# Patient Record
Sex: MALE | Race: WHITE | NOT HISPANIC OR LATINO | Employment: FULL TIME | URBAN - METROPOLITAN AREA
[De-identification: names, ages, dates, MRNs, and addresses within clinical notes are randomized per-mention and may not be internally consistent; named-entity substitution may affect disease eponyms.]

---

## 2017-05-05 ENCOUNTER — TRANSFERRED RECORDS (OUTPATIENT)
Dept: HEALTH INFORMATION MANAGEMENT | Facility: CLINIC | Age: 32
End: 2017-05-05

## 2019-01-08 ENCOUNTER — OFFICE VISIT (OUTPATIENT)
Dept: FAMILY MEDICINE | Facility: CLINIC | Age: 34
End: 2019-01-08
Payer: COMMERCIAL

## 2019-01-08 VITALS
HEIGHT: 72 IN | SYSTOLIC BLOOD PRESSURE: 140 MMHG | DIASTOLIC BLOOD PRESSURE: 90 MMHG | WEIGHT: 205.5 LBS | RESPIRATION RATE: 16 BRPM | OXYGEN SATURATION: 100 % | TEMPERATURE: 98.8 F | BODY MASS INDEX: 27.83 KG/M2 | HEART RATE: 68 BPM

## 2019-01-08 DIAGNOSIS — Z13.220 SCREENING FOR CHOLESTEROL LEVEL: ICD-10-CM

## 2019-01-08 DIAGNOSIS — Z00.00 ENCOUNTER FOR ROUTINE ADULT HEALTH EXAMINATION WITHOUT ABNORMAL FINDINGS: Primary | ICD-10-CM

## 2019-01-08 PROCEDURE — 99385 PREV VISIT NEW AGE 18-39: CPT | Performed by: NURSE PRACTITIONER

## 2019-01-08 ASSESSMENT — MIFFLIN-ST. JEOR: SCORE: 1909.11

## 2019-01-08 ASSESSMENT — PAIN SCALES - GENERAL: PAINLEVEL: MILD PAIN (2)

## 2019-01-08 NOTE — PROGRESS NOTES
SUBJECTIVE:   CC: Rafal pSears is an 33 year old male who presents for preventive health visit.     Healthy Habits:    Do you get at least three servings of calcium containing foods daily (dairy, green leafy vegetables, etc.)? yes    Amount of exercise or daily activities, outside of work: 5 day(s) per week    Problems taking medications regularly No    Medication side effects: No    Have you had an eye exam in the past two years? no    Do you see a dentist twice per year? yes    Do you have sleep apnea, excessive snoring or daytime drowsiness?no    Slight low back pain, left side. Improving over past few weeks.   Left inside big toe slight numbness.     Had ECHO due to fathers poor heart valve 3 years ago and normal    Surgeries: had multiple cysts removed but they were not lipomas        Today's PHQ-2 Score:   PHQ-2 ( 1999 Pf[employer]) 1/8/2019   Q1: Little interest or pleasure in doing things 0   Q2: Feeling down, depressed or hopeless 0   PHQ-2 Score 0       Abuse: Current or Past(Physical, Sexual or Emotional)- No  Do you feel safe in your environment? Yes    Social History     Tobacco Use     Smoking status: Former Smoker     Smokeless tobacco: Never Used   Substance Use Topics     Alcohol use: Yes     Comment: occasional     If you drink alcohol do you typically have >3 drinks per day or >7 drinks per week? No                      Last PSA: No results found for: PSA    Reviewed orders with patient. Reviewed health maintenance and updated orders accordingly - Yes  Labs reviewed in EPIC    Reviewed and updated as needed this visit by clinical staff  Tobacco  Allergies  Meds  Problems  Med Hx  Surg Hx  Fam Hx  Soc Hx          Reviewed and updated as needed this visit by Provider  Tobacco  Allergies  Meds  Problems  Med Hx  Surg Hx  Fam Hx            ROS:  CONSTITUTIONAL: NEGATIVE for fever, chills, change in weight  INTEGUMENTARY/SKIN: NEGATIVE for worrisome rashes, moles or lesions  EYES: NEGATIVE  "for vision changes or irritation  ENT: NEGATIVE for ear, mouth and throat problems  RESP: NEGATIVE for significant cough or SOB  CV: NEGATIVE for chest pain, palpitations or peripheral edema  GI: NEGATIVE for nausea, abdominal pain, heartburn, or change in bowel habits   male: negative for dysuria, hematuria, decreased urinary stream, erectile dysfunction, urethral discharge  MUSCULOSKELETAL: NEGATIVE for significant arthralgias or myalgia  NEURO: NEGATIVE for weakness, dizziness or paresthesias  PSYCHIATRIC: NEGATIVE for changes in mood or affect    OBJECTIVE:   /90 (BP Location: Right arm, Patient Position: Sitting, Cuff Size: Adult Regular)   Pulse 68   Temp 98.8  F (37.1  C) (Oral)   Resp 16   Ht 1.835 m (6' 0.25\")   Wt 93.2 kg (205 lb 8 oz)   SpO2 100%   BMI 27.68 kg/m    EXAM:  GENERAL: healthy, alert and no distress  EYES: Eyes grossly normal to inspection, PERRL and conjunctivae and sclerae normal  HENT: ear canals and TM's normal, nose and mouth without ulcers or lesions  NECK: no adenopathy, no asymmetry, masses, or scars and thyroid normal to palpation  RESP: lungs clear to auscultation - no rales, rhonchi or wheezes  CV: regular rate and rhythm, normal S1 S2, no S3 or S4, no murmur, click or rub, no peripheral edema  ABDOMEN: soft, nontender, no hepatosplenomegaly, no masses and bowel sounds normal  MS: no gross musculoskeletal defects noted, no edema  SKIN: no suspicious lesions or rashes  NEURO: Normal strength and tone, mentation intact and speech normal  PSYCH: mentation appears normal, affect normal/bright    Diagnostic Test Results:  No results found for this or any previous visit (from the past 24 hour(s)).    ASSESSMENT/PLAN:   1. Encounter for routine adult health examination without abnormal findings  Normal exam, return for fasting labs  - Lipid panel reflex to direct LDL Fasting; Future  - Glucose; Future    2. Screening for cholesterol level  As above  - Lipid panel reflex to " "direct LDL Fasting; Future    COUNSELING:  Reviewed preventive health counseling, as reflected in patient instructions    BP Readings from Last 1 Encounters:   01/08/19 140/90     Estimated body mass index is 27.68 kg/m  as calculated from the following:    Height as of this encounter: 1.835 m (6' 0.25\").    Weight as of this encounter: 93.2 kg (205 lb 8 oz).    BP Screening:   Last 3 BP Readings:    BP Readings from Last 3 Encounters:   01/08/19 140/90       The following was recommended to the patient:  Re-screen BP within a year and recommended lifestyle modifications       reports that he has quit smoking. he has never used smokeless tobacco.      Counseling Resources:  ATP IV Guidelines  Pooled Cohorts Equation Calculator  FRAX Risk Assessment  ICSI Preventive Guidelines  Dietary Guidelines for Americans, 2010  USDA's MyPlate  ASA Prophylaxis  Lung CA Screening    Return 1 year or prn    ERLINDA Field, NP-C  [facility]    "

## 2019-04-08 ENCOUNTER — OFFICE VISIT (OUTPATIENT)
Dept: FAMILY MEDICINE | Facility: CLINIC | Age: 34
End: 2019-04-08
Payer: COMMERCIAL

## 2019-04-08 VITALS
DIASTOLIC BLOOD PRESSURE: 100 MMHG | TEMPERATURE: 98 F | SYSTOLIC BLOOD PRESSURE: 142 MMHG | WEIGHT: 205.5 LBS | RESPIRATION RATE: 16 BRPM | BODY MASS INDEX: 27.83 KG/M2 | OXYGEN SATURATION: 100 % | HEIGHT: 72 IN | HEART RATE: 74 BPM

## 2019-04-08 DIAGNOSIS — R05.9 COUGH: Primary | ICD-10-CM

## 2019-04-08 DIAGNOSIS — R68.89 FLU-LIKE SYMPTOMS: ICD-10-CM

## 2019-04-08 DIAGNOSIS — R50.9 FEVER, UNSPECIFIED FEVER CAUSE: ICD-10-CM

## 2019-04-08 DIAGNOSIS — R03.0 ELEVATED BLOOD PRESSURE READING WITHOUT DIAGNOSIS OF HYPERTENSION: ICD-10-CM

## 2019-04-08 DIAGNOSIS — J02.9 SORE THROAT: ICD-10-CM

## 2019-04-08 LAB
DEPRECATED S PYO AG THROAT QL EIA: NORMAL
FLUAV+FLUBV AG SPEC QL: NEGATIVE
FLUAV+FLUBV AG SPEC QL: NEGATIVE
SPECIMEN SOURCE: NORMAL
SPECIMEN SOURCE: NORMAL

## 2019-04-08 PROCEDURE — 87880 STREP A ASSAY W/OPTIC: CPT | Performed by: FAMILY MEDICINE

## 2019-04-08 PROCEDURE — 87804 INFLUENZA ASSAY W/OPTIC: CPT | Performed by: FAMILY MEDICINE

## 2019-04-08 PROCEDURE — 87081 CULTURE SCREEN ONLY: CPT | Performed by: FAMILY MEDICINE

## 2019-04-08 PROCEDURE — 99214 OFFICE O/P EST MOD 30 MIN: CPT | Performed by: FAMILY MEDICINE

## 2019-04-08 RX ORDER — OSELTAMIVIR PHOSPHATE 75 MG/1
75 CAPSULE ORAL 2 TIMES DAILY
Qty: 10 CAPSULE | Refills: 0 | Status: SHIPPED | OUTPATIENT
Start: 2019-04-08 | End: 2020-05-13

## 2019-04-08 ASSESSMENT — MIFFLIN-ST. JEOR: SCORE: 1909.11

## 2019-04-08 ASSESSMENT — PAIN SCALES - GENERAL: PAINLEVEL: MILD PAIN (3)

## 2019-04-08 NOTE — PROGRESS NOTES
SUBJECTIVE:                                                    Rafal Spears is a 33 year old male who presents to clinic today for the following health issues:    Acute Illness   Acute illness concerns: flu sx's  Onset: 1 day    Fever: YES    Chills/Sweats: YES    Headache (location?): YES    Sinus Pressure:YES    Conjunctivitis:  no    Ear Pain: no    Rhinorrhea: YES    Congestion: YES    Sore Throat: YES     Cough: YES-non-productive    Wheeze: no    Decreased Appetite: YES    Nausea: no    Vomiting: no    Diarrhea:  No    Abdominal pain: no    Dysuria/Freq.: no    Fatigue/Achiness: YES    Sick/Strep Exposure: YES- influenza A     Therapies Tried and outcome: Nyquil -     -Patient woke up yesterday with myalgia, subjective fever, cough, congestion, and fatigue. His wife was diagnosed with influenza A last week.   -Has diminished appetite but still eating and drinking   -No history of asthma, previous tobacco smoker but not currently       Problem list and histories reviewed & adjusted, as indicated.  Additional history: as documented    There is no problem list on file for this patient.    No past surgical history on file.    Social History     Tobacco Use     Smoking status: Former Smoker     Smokeless tobacco: Never Used   Substance Use Topics     Alcohol use: Yes     Comment: occasional     Family History   Problem Relation Age of Onset     Thyroid Disease Mother      Heart Disease Father      Other - See Comments Father         heart valve replacement in 2018     Lung Cancer Maternal Grandfather         smoker     Hypertension No family hx of      Anxiety Disorder No family hx of      Alzheimer Disease No family hx of            ROS:  Constitutional, HEENT, cardiovascular, pulmonary, gi and gu systems are negative, except as otherwise noted.    This document serves as a record of the services and decisions personally performed by BELLA RIVAS. It was created on his/her behalf by Ivon  "Sky, a trained medical scribe. The creation of this document is based on the provider's statements to the medical scribe. Ivon Sky, April 8, 2019 9:25 AM  OBJECTIVE:     BP (!) 142/100 (BP Location: Right arm, Patient Position: Sitting, Cuff Size: Adult Regular)   Pulse 74   Temp 98  F (36.7  C) (Oral)   Resp 16   Ht 1.835 m (6' 0.25\")   Wt 93.2 kg (205 lb 8 oz)   SpO2 100%   BMI 27.68 kg/m    Body mass index is 27.68 kg/m .  GENERAL: healthy, alert and no distress  HENT: pharyngeal erythema. Bilateral nasal mucosal edema. ear canals and TM's normal, nose and mouth without ulcers or lesions  NECK: no adenopathy, no asymmetry, masses, or scars and thyroid normal to palpation  RESP: lungs clear to auscultation - no rales, rhonchi or wheezes  CV: regular rate and rhythm, normal S1 S2, no S3 or S4, no murmur, click or rub, no peripheral edema and peripheral pulses strong  ABDOMEN: soft, nontender, no hepatosplenomegaly, no masses and bowel sounds normal  MS: no gross musculoskeletal defects noted, no edema  PSYCH: mentation appears normal, affect normal/bright    Diagnostic Test Results:  Results for orders placed or performed in visit on 04/08/19 (from the past 24 hour(s))   Strep, Rapid Screen   Result Value Ref Range    Specimen Description Throat     Rapid Strep A Screen       NEGATIVE: No Group A streptococcal antigen detected by immunoassay, await culture report.   Influenza A/B antigen   Result Value Ref Range    Influenza A/B Agn Specimen Nasal     Influenza A Negative NEG^Negative    Influenza B Negative NEG^Negative       ASSESSMENT/PLAN:       ICD-10-CM    1. Cough R05 Influenza A/B antigen   2. Fever R50.9 Strep, Rapid Screen     Influenza A/B antigen   3. Sore throat J02.9 Strep, Rapid Screen     Beta strep group A culture   4. Flu-like symptoms R68.89 oseltamivir (TAMIFLU) 75 MG capsule   5. Elevated blood pressure reading without diagnosis of hypertension R03.0      Given recent contact to " confirmed influenza A and symptom description, start Tamiflu. Reviewed symptomatic management of symptoms. Patient education provided, including expected course of illness and symptoms that may occur which would require urgent evalution. All questions answered.     Suspect elevated bp today due to sudafed products and illness. Recent bp was borderline at [facility]. See below for plan. Will need tx if remains elevated. Discussed with patient.   Patient understands and agrees with plan.    Patient Instructions   Avoid Nyquil or sudafed because they can raise your blood pressure. You can take plain Mucinex and tylenol/ibuprofen as needed.     Schedule a medical assistant only visit when you are feeling better to recheck your blood pressure.     Wait at least 24 hours from the end of your fever until you return to work.                  The information in this document, created by the medical scribe for me, accurately reflects the services I personally performed and the decisions made by me. I have reviewed and approved this document for accuracy.   Melissa Ramachandran MD  [facility]

## 2019-04-08 NOTE — PATIENT INSTRUCTIONS
Avoid Nyquil or sudafed because they can raise your blood pressure. You can take plain Mucinex and tylenol/ibuprofen as needed.     Schedule a medical assistant only visit when you are feeling better to recheck your blood pressure.     Wait at least 24 hours from the end of your fever until you return to work.

## 2019-04-08 NOTE — LETTER
April 9, 2019      Rafal Spears  [address]  [place] [address]        Dear Rafal,     Your final strep culture was negative. I hope you will be recovering quickly from this illness.  Certainly be seen in the office if you are not improving as anticipated      Sincerely,        Melissa Ramachandran MD      Results for orders placed or performed in visit on 04/08/19   Strep, Rapid Screen   Result Value Ref Range    Specimen Description Throat     Rapid Strep A Screen       NEGATIVE: No Group A streptococcal antigen detected by immunoassay, await culture report.   Influenza A/B antigen   Result Value Ref Range    Influenza A/B Agn Specimen Nasal     Influenza A Negative NEG^Negative    Influenza B Negative NEG^Negative   Beta strep group A culture   Result Value Ref Range    Specimen Description Throat     Culture Micro No beta hemolytic Streptococcus Group A isolated

## 2019-04-09 PROBLEM — R03.0 ELEVATED BLOOD PRESSURE READING WITHOUT DIAGNOSIS OF HYPERTENSION: Status: ACTIVE | Noted: 2019-04-09

## 2019-04-09 LAB
BACTERIA SPEC CULT: NORMAL
SPECIMEN SOURCE: NORMAL

## 2019-10-22 ENCOUNTER — ALLIED HEALTH/NURSE VISIT (OUTPATIENT)
Dept: NURSING | Facility: CLINIC | Age: 34
End: 2019-10-22
Payer: COMMERCIAL

## 2019-10-22 DIAGNOSIS — Z23 NEED FOR PROPHYLACTIC VACCINATION AND INOCULATION AGAINST INFLUENZA: Primary | ICD-10-CM

## 2019-10-22 PROCEDURE — 90471 IMMUNIZATION ADMIN: CPT

## 2019-10-22 PROCEDURE — 90686 IIV4 VACC NO PRSV 0.5 ML IM: CPT

## 2019-10-22 PROCEDURE — 99207 ZZC NO CHARGE NURSE ONLY: CPT

## 2020-05-13 ENCOUNTER — VIRTUAL VISIT (OUTPATIENT)
Dept: FAMILY MEDICINE | Facility: CLINIC | Age: 35
End: 2020-05-13
Payer: COMMERCIAL

## 2020-05-13 ENCOUNTER — OFFICE VISIT (OUTPATIENT)
Dept: FAMILY MEDICINE | Facility: CLINIC | Age: 35
End: 2020-05-13
Payer: COMMERCIAL

## 2020-05-13 ENCOUNTER — ANCILLARY PROCEDURE (OUTPATIENT)
Dept: GENERAL RADIOLOGY | Facility: CLINIC | Age: 35
End: 2020-05-13
Attending: FAMILY MEDICINE
Payer: COMMERCIAL

## 2020-05-13 VITALS
TEMPERATURE: 99.8 F | OXYGEN SATURATION: 99 % | WEIGHT: 196 LBS | BODY MASS INDEX: 26.4 KG/M2 | HEART RATE: 71 BPM | DIASTOLIC BLOOD PRESSURE: 78 MMHG | SYSTOLIC BLOOD PRESSURE: 122 MMHG

## 2020-05-13 DIAGNOSIS — R07.9 CHEST PAIN, UNSPECIFIED TYPE: ICD-10-CM

## 2020-05-13 DIAGNOSIS — R07.9 CHEST PAIN, UNSPECIFIED TYPE: Primary | ICD-10-CM

## 2020-05-13 PROCEDURE — 99214 OFFICE O/P EST MOD 30 MIN: CPT | Performed by: FAMILY MEDICINE

## 2020-05-13 PROCEDURE — 93000 ELECTROCARDIOGRAM COMPLETE: CPT | Performed by: FAMILY MEDICINE

## 2020-05-13 PROCEDURE — 71046 X-RAY EXAM CHEST 2 VIEWS: CPT | Mod: FY

## 2020-05-13 PROCEDURE — 99207 ZZC NON-BILLABLE SERV PER CHARTING: CPT | Mod: TEL | Performed by: NURSE PRACTITIONER

## 2020-05-13 NOTE — PROGRESS NOTES
Subjective     Rafal Spears is a 34 year old male who presents to clinic today for the following health issues:    HPI   CHEST PAIN     Onset: 2 days     Description:   Location:  entire chest  Character: sharp and burning  Radiation: None   Duration:  Couple seconds     Intensity: moderate    Progression of Symptoms:  waxing and waning    Accompanying Signs & Symptoms:  Shortness of breath: no   Sweating: no   Nausea/vomiting: no   Lightheadedness: no   Palpitations: no  Fever/Chills: no  Cough: no   Heartburn: YES- prior to chest pains starting but no longer    History:   Family history of heart disease no   Tobacco use: YES- past     Precipitating factors:   Worse with exertion: YES- torso movement   Worse with deep breaths :  no   Related to food: no     Alleviating factors:  None        Therapies Tried and outcome: None     On Monday noted generalized cp. Felt like gerd as it was burning and in upper chest area.   Over last several days it feels more generalized as now also on sides, but primary discomfort seems to be around the sternum.    Sharper/burning pain associated with movement, twist.  Pain does not seem to be affected by leaning forward or laying down although movements to get to those positions does cause discomfort    No dyspnea  If take deep breath will occ note the pain  Burping will trigger mild discomfort for brief time  No radiation of pain to the back  Ran on Monday with no issues  Still feels like he could exercise still  But has been trying to lay low the last few days with his current symptoms..     Move/roll over at night will awaken from the pain. So little more tired as not sleeping well.  Otherwise denies malaise, st, cough, fever, cough    Similar type sx's 5 + years ago. Dx with viral pleuritis.  Although notes the symptoms then were preceded by using large hedge tremors aggressively for several hours the day before symptom onset    With the symptoms, he had done a slightly  different workout the day prior to sxs starting  Lunge with twist with weight. Thinks started with a weight that was too heavy (25#).  He eventually decrease the weight but did several wraps before dropping down.  That movement now will trigger discomfort/similar     Sx's not changing over time besides little more wide spread.     At rest, discomfort is 1-2 /10  With trigger (burp/deep breath) 3/10  With rapid movement will go up to 5/10- usually last few secs and then resolves.   No sick contacts.   Working from home since mid-march.     Notes he is very physically fit.  Exercise and runs 5-7 days a week.  He has no underlying cardiopulmonary disease.      Patient Active Problem List   Diagnosis     Elevated blood pressure reading without diagnosis of hypertension     No past surgical history on file.    Social History     Tobacco Use     Smoking status: Former Smoker     Smokeless tobacco: Never Used   Substance Use Topics     Alcohol use: Yes     Comment: occasional     Family History   Problem Relation Age of Onset     Thyroid Disease Mother      Heart Disease Father      Other - See Comments Father         heart valve replacement in 2018     Lung Cancer Maternal Grandfather         smoker     Hypertension No family hx of      Anxiety Disorder No family hx of      Alzheimer Disease No family hx of          No current outpatient medications on file.     No Known Allergies    Reviewed and updated as needed this visit by Provider         Review of Systems   Constitutional, HEENT, cardiovascular, pulmonary, gi and gu systems are negative, except as otherwise noted.      Objective    /78   Pulse 71   Temp 99.8  F (37.7  C) (Oral)   Wt 88.9 kg (196 lb)   SpO2 99%   BMI 26.40 kg/m    Body mass index is 26.4 kg/m .  Physical Exam   GENERAL: healthy, alert and no distress  NECK: no adenopathy, no asymmetry, masses, or scars and thyroid normal to palpation  RESP: lungs clear to auscultation - no rales, rhonchi  or wheezes  CV: regular rate and rhythm, normal S1 S2, no S3 or S4, no murmur, click or rub, no peripheral edema and peripheral pulses strong.  He has mild tenderness along bilateral border of sternum that reproduces his pain to a lesser degree with palpation  ABDOMEN: soft, nontender, no hepatosplenomegaly, no masses and bowel sounds normal  MS: no gross musculoskeletal defects noted, no edema  PSYCH: mentation appears normal, affect normal/bright    Diagnostic Test Results:  CXR -no acute infiltrate or cardiomegaly.  Lung appears fully aerated to me  EKG - appears normal, NSR, normal axis, normal intervals, no acute ST/T changes c/w ischemia, no LVH by voltage criteria, there are no prior tracings available        Assessment & Plan       ICD-10-CM    1. Chest pain, unspecified type  R07.9 EKG 12-lead complete w/read - Clinics     XR Chest 2 Views     Suspect chest wall pain.  He has no dyspnea or associated symptoms that trigger other red flags.  His symptoms are reproducible on exam today and reproducible with movement but not necessarily exertion.  Reviewed diagnosis and treatment including NSAIDs, heat or cold, relative rest.  Reviewed red flag symptoms that would precipitate the need for routine, urgent or emergent f/u     See Patient Instructions    Return in about 2 weeks (around 5/27/2020), or if symptoms worsen or fail to improve.    Melissa Ramachandran MD  [facility]

## 2020-05-13 NOTE — PROGRESS NOTES
"Rafal Spears is a 34 year old male who is being evaluated via a billable telephone visit.      The patient has been notified of following:     \"This telephone visit will be conducted via a call between you and your physician/provider. We have found that certain health care needs can be provided without the need for a physical exam.  This service lets us provide the care you need with a short phone conversation.  If a prescription is necessary we can send it directly to your pharmacy.  If lab work is needed we can place an order for that and you can then stop by our lab to have the test done at a later time.    Telephone visits are billed at different rates depending on your insurance coverage. During this emergency period, for some insurers they may be billed the same as an in-person visit.  Please reach out to your insurance provider with any questions.    If during the course of the call the physician/provider feels a telephone visit is not appropriate, you will not be charged for this service.\"    Patient has given verbal consent for Telephone visit?  Yes    What phone number would you like to be contacted at?     How would you like to obtain your AVS? [employer]    Subjective     Rafal Spears is a 34 year old male who presents to clinic today for the following health issues:    HPI  Chest Pain      Onset: two days, started Monday    Description (location/character/radiation/duration): generalized both side, underneath collar bone and under armpit around rib cages.     Intensity:  moderate    Accompanying signs and symptoms:        Shortness of breath: no        Sweating: no        Nausea/vomitting: no        Palpitations: no        Other (fevers/chills/cough/heartburn/lightheadedness): YES, patient states this feels almost like a heart burn not getting better.    History (similar episodes/previous evaluation): yes, five years ago and was told its viral pleuritis.    Precipitating or alleviating factors:       " Worse with exertion: no , deep breaths increases discomfort, holding breaths feels better.       Worse with breathing: no        Related to eating: no        Better with burping: no     Therapies tried and outcome: None      At first thought it was heartburn. Sharp pain behind his collarbone upper chest. Now more generalized chest pain under armpits around rib cage. If he moves suddenly or change position or burp he gets a sharp pain into the upper chest. Sometimes around sternum, but mostly under armpits and under collar bone. Tried alkacelcer, didn't help. Normal steady breathing no pain, but deep breath has some pain or discomfort. If he holds his breath the pain seems to go away. Denies chest trauma, unusual heavy lifting or twisting. He normally weight lifts, Sunday he tried a new exercise where he lunges and does a slight twist and was using a 25 lb weight (felt a bit heavy for this particular movement but otherwise uses this weight for other exercises). No fever chills. Temp last night was 99, this morning 98.8. hasn't noted if food worsens or improves his symptoms. Doesn't have reflux and doesn't feel this is related to that.  Normal BM, normal urination. No other unusual muscle aches other than chest and from regular weight lifting. No recent long travel.     He is able to work from home. Has been able to reduce exposure with related to COVID-19, has golfed a few times in the past few weeks. Has gone with neighbors and maintained social distance. They are all healthy.           Patient Active Problem List   Diagnosis     Elevated blood pressure reading without diagnosis of hypertension     History reviewed. No pertinent surgical history.    Social History     Tobacco Use     Smoking status: Former Smoker     Smokeless tobacco: Never Used   Substance Use Topics     Alcohol use: Yes     Comment: occasional     Family History   Problem Relation Age of Onset     Thyroid Disease Mother      Heart Disease Father       Other - See Comments Father         heart valve replacement in 2018     Lung Cancer Maternal Grandfather         smoker     Hypertension No family hx of      Anxiety Disorder No family hx of      Alzheimer Disease No family hx of          No current outpatient medications on file.     No Known Allergies    Reviewed and updated as needed this visit by Provider  Tobacco  Allergies  Meds  Problems  Med Hx  Surg Hx  Fam Hx         Review of Systems   Constitutional, HEENT, cardiovascular, pulmonary, gi and gu systems are negative, except as otherwise noted.       Objective   Reported vitals:  There were no vitals taken for this visit.   healthy, alert and no distress  PSYCH: Alert and oriented times 3; coherent speech, normal   rate and volume, able to articulate logical thoughts, able   to abstract reason, no tangential thoughts, no hallucinations   or delusions  His affect is normal  RESP: No cough, no audible wheezing, able to talk in full sentences  Remainder of exam unable to be completed due to telephone visits    Diagnostic Test Results:  Labs reviewed in Epic        Assessment/Plan:  1. Chest pain, unspecified type  Ultimately decided he needed to be seen in person today. No charge for phone visit.     Return in about 1 day (around 5/14/2020), or if symptoms worsen or fail to improve.      Phone call duration:  16 minutes    ERLINDA Field, NP-C  [facility]

## 2020-05-13 NOTE — Clinical Note
FYI: patient coming to see you today at 1:20 pm. Chest pain x 2 days, had pleuritis about 5 years ago and he feels this is similar symptoms. Hx of ECHO in 2016 EF 60% but very mild mitral/tricus regur. Fx hx of mitral valve replacement in father. No fever/chills/recent travel.   Thank you,  ERLINDA Field, NP-C  [facility]

## 2020-05-13 NOTE — PATIENT INSTRUCTIONS
Ibuprofen 600 mg every 6-8 hours (take with food).   Ice/heat as needed   Avoid heavy lifting until feeling           Patient Education     Chest Wall Pain: Costochondritis    The chest pain that you have had today is caused by costochondritis. This condition is caused by an inflammation of the cartilage joining your ribs to your breastbone. It is not caused by heart or lung problems. Your healthcare team has made sure that the chest pain you feel is not from a life threatening cause of chest pain such as heart attack, collapsed lung, blood clot in the lung, tear in the aorta, or esophageal rupture. The inflammation may have been brought on by a blow to the chest, lifting heavy objects, intense exercise, or an illness that made you cough and sneeze a lot. It often occurs during times of emotional stress. It can be painful, but it is not dangerous. It usually goes away in 1 to 2 weeks. But it may happen again. Rarely, a more serious condition may cause symptoms similar to costochondritis. That s why it s important to watch for the warning signs listed below.  Home care  Follow these guidelines when caring for yourself at home:    If you feel that emotional stress is a cause of your condition, try to figure out the sources of that stress. It may not be obvious. Learn ways to deal with the stress in your life. This can include regular exercise, muscle relaxation, meditation, or simply taking time out for yourself.    You may use acetaminophen, ibuprofen, or naproxen to control pain, unless another pain medicine was prescribed. If you have liver or kidney disease or ever had a stomach ulcer, talk with your healthcare provider before using these medicines.    You can also help ease pain by using a hot, wet compress or heating pad. Use this with or without a medicated skin cream that helps relieves pain.    Do stretching exercise as advised by your provider.    Take any prescribed medicines as directed.  Follow-up  care  Follow up with your healthcare provider, or as advised, if you do not start to get better in the next 2 days.  When to seek medical advice  Call your healthcare provider right away if any of these occur:    A change in the type of pain. Call if it feels different, becomes more serious, lasts longer, or spreads into your shoulder, arm, neck, jaw, or back.    Shortness of breath or pain gets worse when you breathe    Weakness, dizziness, or fainting    Cough with dark-colored sputum (phlegm) or blood    Abdominal pain    Dark red or black stools    Fever of 100.4 F (38 C) or higher, or as directed by your healthcare provider  Date Last Reviewed: 12/1/2016 2000-2019 [employer]. [address] [place] [address], [place] [address]. All rights reserved. This information is not intended as a substitute for professional medical care. Always follow your healthcare professional's instructions.           Patient Education     Chest Wall Pain: Costochondritis    The chest pain that you have had today is caused by costochondritis. This condition is caused by an inflammation of the cartilage joining your ribs to your breastbone. It is not caused by heart or lung problems. Your healthcare team has made sure that the chest pain you feel is not from a life threatening cause of chest pain such as heart attack, collapsed lung, blood clot in the lung, tear in the aorta, or esophageal rupture. The inflammation may have been brought on by a blow to the chest, lifting heavy objects, intense exercise, or an illness that made you cough and sneeze a lot. It often occurs during times of emotional stress. It can be painful, but it is not dangerous. It usually goes away in 1 to 2 weeks. But it may happen again. Rarely, a more serious condition may cause symptoms similar to costochondritis. That s why it s important to watch for the warning signs listed below.  Home care  Follow these guidelines when caring for yourself at  home:    If you feel that emotional stress is a cause of your condition, try to figure out the sources of that stress. It may not be obvious. Learn ways to deal with the stress in your life. This can include regular exercise, muscle relaxation, meditation, or simply taking time out for yourself.    You may use acetaminophen, ibuprofen, or naproxen to control pain, unless another pain medicine was prescribed. If you have liver or kidney disease or ever had a stomach ulcer, talk with your healthcare provider before using these medicines.    You can also help ease pain by using a hot, wet compress or heating pad. Use this with or without a medicated skin cream that helps relieves pain.    Do stretching exercise as advised by your provider.    Take any prescribed medicines as directed.  Follow-up care  Follow up with your healthcare provider, or as advised, if you do not start to get better in the next 2 days.  When to seek medical advice  Call your healthcare provider right away if any of these occur:    A change in the type of pain. Call if it feels different, becomes more serious, lasts longer, or spreads into your shoulder, arm, neck, jaw, or back.    Shortness of breath or pain gets worse when you breathe    Weakness, dizziness, or fainting    Cough with dark-colored sputum (phlegm) or blood    Abdominal pain    Dark red or black stools    Fever of 100.4 F (38 C) or higher, or as directed by your healthcare provider  Date Last Reviewed: 12/1/2016 2000-2019 [employer]. [address] [place] [address], [place] [address]. All rights reserved. This information is not intended as a substitute for professional medical care. Always follow your healthcare professional's instructions.

## 2021-01-12 ENCOUNTER — VIRTUAL VISIT (OUTPATIENT)
Dept: FAMILY MEDICINE | Facility: CLINIC | Age: 36
End: 2021-01-12
Payer: COMMERCIAL

## 2021-01-12 DIAGNOSIS — F41.9 ANXIETY: ICD-10-CM

## 2021-01-12 DIAGNOSIS — F33.1 MODERATE RECURRENT MAJOR DEPRESSION (H): Primary | ICD-10-CM

## 2021-01-12 PROCEDURE — 96127 BRIEF EMOTIONAL/BEHAV ASSMT: CPT | Performed by: FAMILY MEDICINE

## 2021-01-12 PROCEDURE — 99215 OFFICE O/P EST HI 40 MIN: CPT | Mod: 95 | Performed by: FAMILY MEDICINE

## 2021-01-12 ASSESSMENT — ANXIETY QUESTIONNAIRES
GAD7 TOTAL SCORE: 13
1. FEELING NERVOUS, ANXIOUS, OR ON EDGE: NEARLY EVERY DAY
IF YOU CHECKED OFF ANY PROBLEMS ON THIS QUESTIONNAIRE, HOW DIFFICULT HAVE THESE PROBLEMS MADE IT FOR YOU TO DO YOUR WORK, TAKE CARE OF THINGS AT HOME, OR GET ALONG WITH OTHER PEOPLE: SOMEWHAT DIFFICULT
6. BECOMING EASILY ANNOYED OR IRRITABLE: NEARLY EVERY DAY
5. BEING SO RESTLESS THAT IT IS HARD TO SIT STILL: SEVERAL DAYS
2. NOT BEING ABLE TO STOP OR CONTROL WORRYING: SEVERAL DAYS
3. WORRYING TOO MUCH ABOUT DIFFERENT THINGS: SEVERAL DAYS
7. FEELING AFRAID AS IF SOMETHING AWFUL MIGHT HAPPEN: SEVERAL DAYS

## 2021-01-12 ASSESSMENT — PATIENT HEALTH QUESTIONNAIRE - PHQ9
SUM OF ALL RESPONSES TO PHQ QUESTIONS 1-9: 19
5. POOR APPETITE OR OVEREATING: NEARLY EVERY DAY

## 2021-01-12 NOTE — PROGRESS NOTES
Rafal is a 35 year old who is being evaluated via a billable telephone visit.      What phone number would you like to be contacted at? [phone]  How would you like to obtain your AVS? MyChart (patient would like to sign up)    Assessment & Plan     Moderate recurrent major depression (H)  Anxiety  New dx. No hx of howard. Reviewed onset of action of meds, common side effects and plan for close f/u. Encouraged call to clinic if symptoms worsening or adverse reactions. Reduce or eliminate etoh discussed.   - sertraline (ZOLOFT) 50 MG tablet; Take 0.5 tablets (25 mg) by mouth daily for 7 days, THEN 1 tablet (50 mg) daily.      44 minutes spent on the date of the encounter doing chart review, patient visit and documentation        Depression Screening Follow Up    PHQ 1/12/2021   PHQ-9 Total Score 19   Q9: Thoughts of better off dead/self-harm past 2 weeks Not at all         Follow Up Actions Taken  4 weeks       Return in about 4 weeks (around 2/9/2021) for med check, using a video visit.    Melissa Ramachandran MD  [facility]     Rafal is a 35 year old who presents to clinic today for the following health issues     HPI   Depression/anxiety issues starting in June/July 2020  Tried to push through but getting worse over the last 2 months.   Also started through employer, virtual therapist.     On edge    Notes in college after freshman year thinks maybe had depression at that time time that lasted for one year but never f/u'd.     Abnormal Mood Symptoms  Onset/Duration:   Description: see above    Depression (if yes, do PHQ-9): YES  Anxiety (if yes, do NONA-7): YES  Accompanying Signs & Symptoms:  Still participating in activities that you used to enjoy: YES- try to. Trying to run/cycle/lift weights but harder in the last few weeks to get the motivation to do it.   Fatigue: YES  Irritability: YES  Difficulty concentrating: YES  Changes in appetite: YES-  increased  Problems with sleep: YES- every night wakening at 0300 and then up 1-2 hours.   Heart racing/beating fast: no  Abnormally elevated, expansive, or irritable mood: no  Persistently increased activity or energy: no  Thoughts of hurting yourself or others: no  History:  Recent stress or major life event: no. Notes lack of events from isolation has been impactful - not being able to see friends/families   Prior depression or anxiety: yes, as above  Family history of depression or anxiety: no  Alcohol/drug use: YES- not excessive/causing problems per patient. Usually 1 drink per day and occ 2nd.   Difficulty sleeping: YES- see above.   Precipitating or alleviating factors: being with friends/family and being outdoors tend to help.  Therapies tried and outcome: see above.     PHQ-9 SCORE 1/12/2021   PHQ-9 Total Score 19     NONA-7 SCORE 1/12/2021   Total Score 13         Review of Systems   Constitutional, HEENT, cardiovascular, pulmonary, gi and gu systems are negative, except as otherwise noted.  Wt up a little bit in the last month as has been eating more. Feels less self control related to food/eating more sweets.         Objective           Vitals:  No vitals were obtained today due to virtual visit.    Physical Exam   healthy, alert and no distress  PSYCH: Alert and oriented times 3; coherent speech, normal   rate and volume, able to articulate logical thoughts, able   to abstract reason, no tangential thoughts, no hallucinations   or delusions  His affect is normal  RESP: No cough, no audible wheezing, able to talk in full sentences  Remainder of exam unable to be completed due to telephone visits            Phone call duration: 42 minutes

## 2021-01-13 ASSESSMENT — ANXIETY QUESTIONNAIRES: GAD7 TOTAL SCORE: 13

## 2021-01-23 ENCOUNTER — HEALTH MAINTENANCE LETTER (OUTPATIENT)
Age: 36
End: 2021-01-23

## 2021-02-03 DIAGNOSIS — F33.1 MODERATE RECURRENT MAJOR DEPRESSION (H): ICD-10-CM

## 2021-02-03 DIAGNOSIS — F41.9 ANXIETY: ICD-10-CM

## 2021-02-03 NOTE — TELEPHONE ENCOUNTER
Routing refill request to provider for review/approval because:  PHQ-9 above protocol  PHQ 1/12/2021   PHQ-9 Total Score 19   Q9: Thoughts of better off dead/self-harm past 2 weeks Not at all     Juany Balderas RN, BSN, CMSRN  [facility]

## 2021-02-09 ENCOUNTER — VIRTUAL VISIT (OUTPATIENT)
Dept: FAMILY MEDICINE | Facility: CLINIC | Age: 36
End: 2021-02-09
Payer: COMMERCIAL

## 2021-02-09 DIAGNOSIS — F33.1 MODERATE RECURRENT MAJOR DEPRESSION (H): Primary | ICD-10-CM

## 2021-02-09 DIAGNOSIS — F41.9 ANXIETY: ICD-10-CM

## 2021-02-09 PROCEDURE — 99214 OFFICE O/P EST MOD 30 MIN: CPT | Mod: 95 | Performed by: FAMILY MEDICINE

## 2021-02-09 RX ORDER — BUPROPION HYDROCHLORIDE 150 MG/1
150 TABLET ORAL EVERY MORNING
Qty: 30 TABLET | Refills: 1 | Status: SHIPPED | OUTPATIENT
Start: 2021-02-09 | End: 2021-03-08

## 2021-02-09 NOTE — PATIENT INSTRUCTIONS
Try changing the time of day you take the sertraline to afternoon or evening.     If ongoing side effects after one more week with changes above, you can start wellbutrin XL once daily in the morning.

## 2021-02-09 NOTE — PROGRESS NOTES
"Rafal is a 35 year old who is being evaluated via a billable telephone visit.      What phone number would you like to be contacted at? See epic  How would you like to obtain your AVS? MyChart     Assessment & Plan     Moderate recurrent major depression (H)  Anxiety  Improved with start of sertraline. Some fatigue and sexual AE present.   See patient instructions. Reviewed wellbutrin start. Reviewed onset of action of meds, common side effects and plan for close f/u. Encouraged call to clinic if symptoms worsening or adverse reactions.         31 minutes spent on the date of the encounter doing chart review, patient visit and documentation            Patient Instructions   Try changing the time of day you take the sertraline to afternoon or evening.     If ongoing side effects after one more week with changes above, you can start wellbutrin XL once daily in the morning.           Return in about 4 weeks (around 3/9/2021) for med check, using a video visit.    Melissa Ramachandran MD  [facility]     Rafal is a 35 year old who presents to clinic today for the following health issues     HPI     Last visit (1/12/21) was started on sertraline for new dx of MDD, recurrent and Anxiety.     Reports \"doing pretty good\". About 70--80%  A lot better than one month ago  Feels med is working.   Noting more like himself and mood seems more steady/stable. Less night wakening and less negative thoughts. Less tense, less down and depressed    Tolerating med pretty well currently  First 1-1.5 weeks had some digestive issues and headaches but these are improved.   Tired a lot. At times feels like he wants to rest in the middle of the day.   Also by evening is exhausted. Also had some sexual side effects/libido.   Not worsening or improving.     No hx of sz.           Objective           Vitals:  No vitals were obtained today due to virtual visit.    Physical Exam   healthy, alert and " no distress  PSYCH: Alert and oriented times 3; coherent speech, normal   rate and volume, able to articulate logical thoughts, able   to abstract reason, no tangential thoughts, no hallucinations   or delusions  His affect is normal and pleasant  RESP: No cough, no audible wheezing, able to talk in full sentences  Remainder of exam unable to be completed due to telephone visits            Phone call duration: 24 minutes

## 2021-03-06 ENCOUNTER — TELEPHONE (OUTPATIENT)
Dept: FAMILY MEDICINE | Facility: CLINIC | Age: 36
End: 2021-03-06

## 2021-03-06 DIAGNOSIS — F33.1 MODERATE RECURRENT MAJOR DEPRESSION (H): ICD-10-CM

## 2021-03-08 RX ORDER — BUPROPION HYDROCHLORIDE 150 MG/1
150 TABLET ORAL EVERY MORNING
Qty: 30 TABLET | Refills: 0 | Status: SHIPPED | OUTPATIENT
Start: 2021-03-08 | End: 2021-03-23

## 2021-03-08 NOTE — TELEPHONE ENCOUNTER
Overdue for PHQ-9/ depression follow-up with provider.     No appointment pending at this time.  Routing to provider to advise.    Lyndsay DURPEEN, RN

## 2021-03-09 NOTE — TELEPHONE ENCOUNTER
Sonia refill sent  Due for med check prior to further refills (virutal visit is ok)  Please send reminder

## 2021-03-23 ENCOUNTER — VIRTUAL VISIT (OUTPATIENT)
Dept: FAMILY MEDICINE | Facility: CLINIC | Age: 36
End: 2021-03-23
Payer: COMMERCIAL

## 2021-03-23 DIAGNOSIS — F41.9 ANXIETY: Primary | ICD-10-CM

## 2021-03-23 DIAGNOSIS — F33.1 MODERATE RECURRENT MAJOR DEPRESSION (H): ICD-10-CM

## 2021-03-23 PROCEDURE — 99213 OFFICE O/P EST LOW 20 MIN: CPT | Mod: TEL | Performed by: FAMILY MEDICINE

## 2021-03-23 RX ORDER — BUPROPION HYDROCHLORIDE 100 MG/1
100 TABLET, EXTENDED RELEASE ORAL EVERY MORNING
Qty: 30 TABLET | Refills: 1 | Status: SHIPPED | OUTPATIENT
Start: 2021-03-23 | End: 2021-04-14

## 2021-03-23 NOTE — LETTER
My Depression Action Plan  Name: Rafal Spears   Date of Birth 1985  Date: 3/23/2021    My doctor: Clinic, [place]   My clinic: [place] [id number] [facility] [id number] [phone]          GREEN    ZONE   Good Control    What it looks like:     Things are going generally well. You have normal ups and downs. You may even feel depressed from time to time, but bad moods usually last less than a day.   What you need to do:  1. Continue to care for yourself (see self care plan)  2. Check your depression survival kit and update it as needed  3. Follow your physician s recommendations including any medication.  4. Do not stop taking medication unless you consult with your physician first.           YELLOW         ZONE Getting Worse    What it looks like:     Depression is starting to interfere with your life.     It may be hard to get out of bed; you may be starting to isolate yourself from others.    Symptoms of depression are starting to last most all day and this has happened for several days.     You may have suicidal thoughts but they are not constant.   What you need to do:     1. Call your care team. Your response to treatment will improve if you keep your care team informed of your progress. Yellow periods are signs an adjustment may need to be made.     2. Continue your self-care.  Just get dressed and ready for the day.  Don't give yourself time to talk yourself out of it.    3. Talk to someone in your support network.    4. Open up your Depression Self-Care Plan/Wellness Kit.           RED    ZONE Medical Alert - Get Help    What it looks like:     Depression is seriously interfering with your life.     You may experience these or other symptoms: You can t get out of bed most days, can t work or engage in other necessary activities, you have trouble taking care of basic hygiene, or basic responsibilities, thoughts of suicide or  death that will not go away, self-injurious behavior.     What you need to do:  1. Call your care team and request a same-day appointment. If they are not available (weekends or after hours) call your local crisis line, emergency room or 911.          Depression Self-Care Plan / Wellness Kit    Many people find that medication and therapy are helpful treatments for managing depression. In addition, making small changes to your everyday life can help to boost your mood and improve your wellbeing. Below are some tips for you to consider. Be sure to talk with your medical provider and/or behavioral health consultant if your symptoms are worsening or not improving.     Sleep   Sleep hygiene  means all of the habits that support good, restful sleep. It includes maintaining a consistent bedtime and wake time, using your bedroom only for sleeping or sex, and keeping the bedroom dark and free of distractions like a computer, smartphone, or television.     Develop a Healthy Routine  Maintain good hygiene. Get out of bed in the morning, make your bed, brush your teeth, take a shower, and get dressed. Don t spend too much time viewing media that makes you feel stressed. Find time to relax each day.    Exercise  Get some form of exercise every day. This will help reduce pain and release endorphins, the  feel good  chemicals in your brain. It can be as simple as just going for a walk or doing some gardening, anything that will get you moving.      Diet  Strive to eat healthy foods, including fruits and vegetables. Drink plenty of water. Avoid excessive sugar, caffeine, alcohol, and other mood-altering substances.     Stay Connected with Others  Stay in touch with friends and family members.    Manage Your Mood  Try deep breathing, massage therapy, biofeedback, or meditation. Take part in fun activities when you can. Try to find something to smile about each day.     Psychotherapy  Be open to working with a therapist if your  provider recommends it.     Medication  Be sure to take your medication as prescribed. Most anti-depressants need to be taken every day. It usually takes several weeks for medications to work. Not all medicines work for all people. It is important to follow-up with your provider to make sure you have a treatment plan that is working for you. Do not stop your medication abruptly without first discussing it with your provider.    Crisis Resources   These hotlines are for both adults and children. They and are open 24 hours a day, 7 days a week unless noted otherwise.      National Suicide Prevention Lifeline   5-572-642-MWQC (9354)      Crisis Text Line    www.crisistextline.org  Text HOME to 201526 from anywhere in the [place], anytime, about any type of crisis. A live, trained crisis counselor will receive the text and respond quickly.      [place] Lifeline for LGBTQ Youth  A national crisis intervention and suicide lifeline for LGBTQ youth under 25. Provides a safe place to talk without judgement. Call 1-169.835.9742; text START to 668381 or visit www.thetrevorproject.org to talk to a trained counselor.      For [facility] crisis numbers, visit the [facility] website at:  https://[place].gov/dhs/people-we-serve/adults/health-care/mental-health/resources/crisis-contacts.jsp

## 2021-03-23 NOTE — PROGRESS NOTES
Rafal is a 35 year old who is being evaluated via a billable telephone visit.      What phone number would you like to be contacted at? Cell   How would you like to obtain your AVS? MyChart    Assessment & Plan     Anxiety  Moderate recurrent major depression (H)  Overall doing well but some adverse effects from the Wellbutrin 150 mg XL dose.  Reviewed the option of stopping the medicine altogether versus trialing 100 mg dose of the SR version.  Patient is in favor of the latter and we talked through the transition and monitoring of his symptoms post med change.  If he does well with this change and wants to continue medication will plan for next med check in 6 months.  If he has ongoing mood concerns or medication side effects he should certainly follow-up with me sooner.  It also would be okay to stop Wellbutrin altogether if he does not find it beneficial to make the change above.  Patient feels comfortable with this plan and all questions were answered.  We will send depression action plan in the mail to patient.  - buPROPion (WELLBUTRIN SR) 100 MG 12 hr tablet; Take 1 tablet (100 mg) by mouth every morning         Patient Instructions   Stop wellbutrin  mg   And start wellbutrin  mg daily in the morning.     If working well we can plan next med check in six months  If mood or medication concerns please follow-up with me much sooner and as needed.    If the transition to the lower dose of Wellbutrin does not work well for you it is okay to stop this medication abruptly       Return in about 6 months (around 9/23/2021).    Melissa Ramachandran MD  [facility]   Rafal is a 35 year old who presents for the following health issues    HPI     Overall doing well.   wellbutrin started last visit. Not sure if wanting to continue on the med. Had started for fatigue and sexual side effects of sertraline   Little more irritable/agitated since starting  More sleep  disruptions and middle of night wakening. Then feels tired the next day.   Slight improvement in sexual side effects with the med and does have more activated feeling in morning especially.     In general, mood is better, more stable overall. No longer feeling depressed and sad. More enjoyment in daily life, better connections with family. No longer craving etoh at the end of the day. Feel more steady/stable.       PHQ-9 SCORE 1/12/2021 2/25/2021   PHQ-9 Total Score MyChart - 7 (Mild depression)   PHQ-9 Total Score 19 7     NONA-7 SCORE 1/12/2021 2/25/2021   Total Score - 5 (mild anxiety)   Total Score 13 5             Objective           Vitals:  No vitals were obtained today due to virtual visit.    Physical Exam   healthy, alert and no distress  PSYCH: Alert and oriented times 3; coherent speech, normal   rate and volume, able to articulate logical thoughts, able   to abstract reason, no tangential thoughts, no hallucinations   or delusions  His affect is normal and pleasant  RESP: No cough, no audible wheezing, able to talk in full sentences  Remainder of exam unable to be completed due to telephone visits        Phone call duration: 14 minutes

## 2021-03-23 NOTE — PATIENT INSTRUCTIONS
Stop wellbutrin  mg   And start wellbutrin  mg daily in the morning.     If working well we can plan next med check in six months  If mood or medication concerns please follow-up with me much sooner and as needed.    If the transition to the lower dose of Wellbutrin does not work well for you it is okay to stop this medication abruptly

## 2021-05-28 ENCOUNTER — NURSE TRIAGE (OUTPATIENT)
Dept: NURSING | Facility: CLINIC | Age: 36
End: 2021-05-28

## 2021-05-28 DIAGNOSIS — F33.1 MODERATE RECURRENT MAJOR DEPRESSION (H): ICD-10-CM

## 2021-05-28 DIAGNOSIS — F41.9 ANXIETY: ICD-10-CM

## 2021-05-28 RX ORDER — BUPROPION HYDROCHLORIDE 100 MG/1
100 TABLET, EXTENDED RELEASE ORAL EVERY MORNING
Qty: 5 TABLET | Refills: 0 | Status: SHIPPED | OUTPATIENT
Start: 2021-05-28 | End: 2021-09-07

## 2021-05-28 NOTE — TELEPHONE ENCOUNTER
Patient calling following up on status of refill. See other telephone encounter. RN called over to [facility] and spoke with Celia DURANT, who stated she will address refill request for patient. Patient informed and verbalizes understanding.     Nuzhat Calzada RN, BSN Nurse Triage Advisor 9:08 AM 5/28/2021

## 2021-05-28 NOTE — TELEPHONE ENCOUNTER
Triage call:     Is in [place] today- traveling and forgot his medication at home  Requesting a bridge prescription through Monday  Bupropion and Sertraline  Pharmacy pended in encounter.   Patient states there is no nearby [facility] pharmacy- requested to be sent to [facility]- pended in encounter.    Provider please advise on bridge refill request    Emili Regalado RN BSN 5/28/2021 6:49 AM      Reason for Disposition    Caller has urgent medication question about med that PCP prescribed and triager unable to answer question    Additional Information    Negative: Drug overdose and triager unable to answer question    Negative: Caller requesting information unrelated to medicine    Negative: Caller requesting a prescription for Strep throat and has a positive culture result    Negative: Rash while taking a medication or within 3 days of stopping it    Negative: Immunization reaction suspected    Negative: Asthma and having symptoms of asthma (cough, wheezing, etc.)    Negative: Breastfeeding questions about mother's medicines and diet    Negative: MORE THAN A DOUBLE DOSE of a prescription or over-the-counter (OTC) drug    Negative: DOUBLE DOSE (an extra dose or lesser amount) of over-the-counter (OTC) drug and any symptoms (e.g., dizziness, nausea, pain, sleepiness)    Negative: DOUBLE DOSE (an extra dose or lesser amount) of prescription drug and any symptoms (e.g., dizziness, nausea, pain, sleepiness)    Negative: Took another person's prescription drug    Negative: DOUBLE DOSE (an extra dose or lesser amount) of prescription drug and NO symptoms (Exception: a double dose of antibiotics)    Negative: Diabetes drug error or overdose (e.g., took wrong type of insulin or took extra dose)    Negative: Caller has medication question about med not prescribed by PCP and triager unable to answer question (e.g., compatibility with other med, storage)    Negative: Request for URGENT new prescription or refill of 'essential'  medication (i.e., likelihood of harm to patient if not taken) and triager unable to fill per department policy    Negative: Prescription not at pharmacy and was prescribed today by PCP    Negative: Pharmacy calling with prescription questions and triager unable to answer question    Protocols used: MEDICATION QUESTION CALL-A-OH

## 2021-06-30 ENCOUNTER — NURSE TRIAGE (OUTPATIENT)
Dept: NURSING | Facility: CLINIC | Age: 36
End: 2021-06-30

## 2021-06-30 ENCOUNTER — TELEPHONE (OUTPATIENT)
Dept: FAMILY MEDICINE | Facility: CLINIC | Age: 36
End: 2021-06-30

## 2021-06-30 NOTE — TELEPHONE ENCOUNTER
Called pt and let him know that there is no current recommendation in getting a booster vaccine and to check the cdc web page for up to date info

## 2021-06-30 NOTE — TELEPHONE ENCOUNTER
Patient received the Eliu and Eliu vaccine in March.  Patient will be traveling to [place] as well as doing a bike trip across [place] in the next couple months  Due to the new variant, patient wondering if should get the Pfizer vaccine as a booster      To provider to advise    Celia DUPREEN, RN

## 2021-06-30 NOTE — TELEPHONE ENCOUNTER
Will be traveling in [place] soon.  Askign about pfizer booster shot only.  Gave him number for MDALEXANDRA.    Mary Peace RN  [facility] Nurse Advisor

## 2021-06-30 NOTE — TELEPHONE ENCOUNTER
No booster shots are currently being recommended. Recommend reviewing CDC.gov for latest on COVID vaccination update and keeping himself safe during travels.

## 2021-08-12 DIAGNOSIS — F41.9 ANXIETY: ICD-10-CM

## 2021-08-12 DIAGNOSIS — F33.1 MODERATE RECURRENT MAJOR DEPRESSION (H): ICD-10-CM

## 2021-08-12 NOTE — TELEPHONE ENCOUNTER
"Requested Prescriptions   Pending Prescriptions Disp Refills    sertraline (ZOLOFT) 50 MG tablet [Pharmacy Med Name: SERTRALINE HCL 50 MG TABLET] 90 tablet 1     Sig: TAKE 1 TABLET BY MOUTH EVERY DAY        SSRIs Protocol Failed - 8/12/2021 12:21 AM        Failed - PHQ-9 score less than 5 in past 6 months     Please review last PHQ-9 score.           Passed - Medication is active on med list        Passed - Patient is age 18 or older        Passed - Recent (6 mo) or future (30 days) visit within the authorizing provider's specialty     Patient had office visit in the last 6 months or has a visit in the next 30 days with authorizing provider or within the authorizing provider's specialty.  See \"Patient Info\" tab in inbasket, or \"Choose Columns\" in Meds & Orders section of the refill encounter.                  "

## 2021-08-31 ENCOUNTER — MYC MEDICAL ADVICE (OUTPATIENT)
Dept: FAMILY MEDICINE | Facility: CLINIC | Age: 36
End: 2021-08-31

## 2021-08-31 NOTE — TELEPHONE ENCOUNTER
Routing to provider to review and advise.   Omayra George RN, BSN   [facility]

## 2021-09-04 ENCOUNTER — HEALTH MAINTENANCE LETTER (OUTPATIENT)
Age: 36
End: 2021-09-04

## 2021-09-07 ENCOUNTER — VIRTUAL VISIT (OUTPATIENT)
Dept: FAMILY MEDICINE | Facility: CLINIC | Age: 36
End: 2021-09-07
Payer: COMMERCIAL

## 2021-09-07 DIAGNOSIS — F33.1 MODERATE RECURRENT MAJOR DEPRESSION (H): ICD-10-CM

## 2021-09-07 DIAGNOSIS — F41.9 ANXIETY: ICD-10-CM

## 2021-09-07 PROCEDURE — 99214 OFFICE O/P EST MOD 30 MIN: CPT | Mod: GT | Performed by: FAMILY MEDICINE

## 2021-09-07 RX ORDER — BUPROPION HYDROCHLORIDE 100 MG/1
100 TABLET, EXTENDED RELEASE ORAL 2 TIMES DAILY
Refills: 0 | COMMUNITY
Start: 2021-09-07 | End: 2021-10-27

## 2021-09-07 RX ORDER — BUPROPION HYDROCHLORIDE 100 MG/1
100 TABLET, EXTENDED RELEASE ORAL EVERY MORNING
Qty: 180 TABLET | Refills: 0 | COMMUNITY
Start: 2021-09-07 | End: 2021-09-07

## 2021-09-07 NOTE — PATIENT INSTRUCTIONS
D-3 1,000 international unit(s) per day.     Increase wellbutrin SR to twice daily. Take 2nd dose mid-day/early afternoon.     Minimize alcohol intake

## 2021-09-07 NOTE — PROGRESS NOTES
aRfal is a 36 year old who is being evaluated via a billable telephone visit.      What phone number would you like to be contacted at? [phone]  How would you like to obtain your AVS? [facility]    Assessment & Plan     Moderate recurrent major depression (H)  Anxiety  Phq9/gad7 sent to patient via [employer] to complete.   Noting vegetative symptoms of depression returning.  We reviewed the option of either increasing the sertraline or the Wellbutrin but given the Wellbutrin had given him excellent improvement in energy and focus in the past will trial this first.  Will increase current Wellbutrin SR to twice daily dosing.  Discussed midday dosing of the Wellbutrin so it does not cause interference with sleep as the XL version was causing in the past.  Reviewed onset of action of meds, common side effects and plan for close f/u. Encouraged call to clinic if symptoms worsening or adverse reactions.   - buPROPion (WELLBUTRIN SR) 100 MG 12 hr tablet; Take 1 tablet (100 mg) by mouth twice daily         Patient Instructions   D-3 1,000 international unit(s) per day.     Increase wellbutrin SR to twice daily. Take 2nd dose mid-day/early afternoon.     Minimize alcohol intake      Return in about 4 weeks (around 10/5/2021) for med check.    Melissa Ramachandran MD  [facility]   Rafal is a 36 year old who presents for the following health issues     HPI     Noticed over the course of the lst month his medication has been less effective.   Feeling more lethargy, lack of interest in activites, more depressive sx's creeping back in  Poor energy is main issue. Harder to focus  No situational triggers.   etoh- no change in intake.   Sleep is good, getting 8 hours. NO MORENO sx's.  No significant missed doses.   Anxiety at baseline.   Still exercising on regular basis.       PHQ-9 SCORE 1/12/2021 2/25/2021 4/16/2021   PHQ-9 Total Score [facility] - 7 (Mild depression) -   PHQ-9 Total  Score 19 7 7     NONA-7 SCORE 1/12/2021 2/25/2021   Total Score - 5 (mild anxiety)   Total Score 13 5             Objective           Vitals:  No vitals were obtained today due to virtual visit.    Physical Exam   healthy, alert and no distress  PSYCH: Alert and oriented times 3; coherent speech, normal   rate and volume, able to articulate logical thoughts, able   to abstract reason, no tangential thoughts, no hallucinations   or delusions  His affect is normal  RESP: No cough, no audible wheezing, able to talk in full sentences  Remainder of exam unable to be completed due to telephone visits              Phone call duration: 16 minutes

## 2021-10-27 ENCOUNTER — TELEPHONE (OUTPATIENT)
Dept: FAMILY MEDICINE | Facility: CLINIC | Age: 36
End: 2021-10-27

## 2021-10-27 DIAGNOSIS — F41.9 ANXIETY: ICD-10-CM

## 2021-10-27 DIAGNOSIS — F33.1 MODERATE RECURRENT MAJOR DEPRESSION (H): ICD-10-CM

## 2021-10-27 RX ORDER — BUPROPION HYDROCHLORIDE 100 MG/1
100 TABLET, EXTENDED RELEASE ORAL 2 TIMES DAILY
Qty: 60 TABLET | Refills: 0 | Status: SHIPPED | OUTPATIENT
Start: 2021-10-27 | End: 2021-12-13

## 2021-10-27 NOTE — TELEPHONE ENCOUNTER
Given one month- assist with scheduling virtual visit with Dr. Ramachandran prior to any additional refills

## 2021-10-27 NOTE — TELEPHONE ENCOUNTER
Overdue for PHQ-9/ depression follow-up with provider.     No appointment pending at this time.  Routing to provider to advise.    Lyndsay DUPREEN, RN

## 2021-11-12 DIAGNOSIS — F33.1 MODERATE RECURRENT MAJOR DEPRESSION (H): ICD-10-CM

## 2021-11-12 DIAGNOSIS — F41.9 ANXIETY: ICD-10-CM

## 2021-11-12 NOTE — TELEPHONE ENCOUNTER
Prescription approved per [facility] Refill Protocol.  Omayra George RN, BSN   [facility]

## 2021-11-16 ENCOUNTER — VIRTUAL VISIT (OUTPATIENT)
Dept: FAMILY MEDICINE | Facility: CLINIC | Age: 36
End: 2021-11-16
Payer: COMMERCIAL

## 2021-11-16 DIAGNOSIS — F41.9 ANXIETY: ICD-10-CM

## 2021-11-16 DIAGNOSIS — F33.1 MODERATE RECURRENT MAJOR DEPRESSION (H): Primary | ICD-10-CM

## 2021-11-16 PROCEDURE — 99214 OFFICE O/P EST MOD 30 MIN: CPT | Mod: GT | Performed by: FAMILY MEDICINE

## 2021-11-16 RX ORDER — BUPROPION HYDROCHLORIDE 150 MG/1
150 TABLET, EXTENDED RELEASE ORAL DAILY
Qty: 30 TABLET | Refills: 0 | Status: SHIPPED | OUTPATIENT
Start: 2021-11-16 | End: 2021-12-13

## 2021-11-16 NOTE — PROGRESS NOTES
Rafal is a 36 year old who is being evaluated via a billable telephone visit.      What phone number would you like to be contacted at? [phone]  How would you like to obtain your AVS? MyChart    Assessment & Plan     Moderate recurrent major depression (H)  Anxiety  Was unable to take Wellbutrin twice daily due to missing the second dose fairly consistently.  Will trial a small increase in the morning dose and titrate up as needed.  Could still consider increasing sertraline also in the future if the Wellbutrin change is not helpful or give side effects.  He did have side effects from the extended release version of Wellbutrin and thus we have  Back to that.  See instructions below  - buPROPion (WELLBUTRIN SR) 150 MG 12 hr tablet; Take 1 tablet (150 mg) by mouth daily         Patient Instructions   Stop wellbutrin  mg daily and start Wellbutrin  mg daily.   After three weeks, evaluate how things are going. If happy with change and no further adjustments needed please message me with an update and I'll send in the refill.     If you have tolerated the increase to 150 mg daily but still feel you could benefit from increasing the dose further, push up dose to 200 mg each morning (take two of the wellbutrin  mg tabs together).   Follow-up with me 3 weeks after this.           Return in about 6 months (around 5/16/2022) for med check and as needed sooner if further medication adjustments needed.    Melissa Ramachandran MD  [facility]   Rafal is a 36 year old who presents for the following health issues   HPI     Sept: dose of wellbutrin was increased to bid.   Had hard time taking the 2nd pill, hard time getting into the habit. Miss it most days. Took for about a week regularly but then started missing.   He did not notice any benefit from this medication but as noted above never really was on it distantly.    Mood is about the same as last visit with  no changes.  Anxiety continues to be fairly well controlled  Per last visit notes:  Feeling more lethargy, lack of interest in activites, more depressive sx's creeping back in  Poor energy is main issue. Harder to focus  No situational triggers.         PHQ-9 SCORE 1/12/2021 2/25/2021 4/16/2021   PHQ-9 Total Score MyChart - 7 (Mild depression) -   PHQ-9 Total Score 19 7 7     NONA-7 SCORE 1/12/2021 2/25/2021   Total Score - 5 (mild anxiety)   Total Score 13 5                Objective           Vitals:  No vitals were obtained today due to virtual visit.    Physical Exam   healthy, alert and no distress  PSYCH: Alert and oriented times 3; coherent speech, normal   rate and volume, able to articulate logical thoughts, able   to abstract reason, no tangential thoughts, no hallucinations   or delusions  His affect is normal and pleasant  RESP: No cough, no audible wheezing, able to talk in full sentences  Remainder of exam unable to be completed due to telephone visits                Phone call duration: 9 minutes

## 2021-11-16 NOTE — PATIENT INSTRUCTIONS
Stop wellbutrin  mg daily and start Wellbutrin  mg daily.   After three weeks, evaluate how things are going. If happy with change and no further adjustments needed please message me with an update and I'll send in the refill.     If you have tolerated the increase to 150 mg daily but still feel you could benefit from increasing the dose further, push up dose to 200 mg each morning (take two of the wellbutrin  mg tabs together).   Follow-up with me 3 weeks after this.

## 2021-12-09 ENCOUNTER — MYC MEDICAL ADVICE (OUTPATIENT)
Dept: FAMILY MEDICINE | Facility: CLINIC | Age: 36
End: 2021-12-09
Payer: COMMERCIAL

## 2021-12-09 DIAGNOSIS — F41.9 ANXIETY: ICD-10-CM

## 2021-12-09 DIAGNOSIS — F33.1 MODERATE RECURRENT MAJOR DEPRESSION (H): ICD-10-CM

## 2021-12-09 NOTE — TELEPHONE ENCOUNTER
"Routing refill request to provider for review/approval because:  Requested Prescriptions   Pending Prescriptions Disp Refills    buPROPion (WELLBUTRIN SR) 150 MG 12 hr tablet [Pharmacy Med Name: BUPROPION HCL  MG TABLET] 30 tablet 0     Sig: TAKE 1 TABLET BY MOUTH EVERY DAY        SSRIs Protocol Failed - 12/9/2021 12:18 AM        Failed - PHQ-9 score less than 5 in past 6 months     Please review last PHQ-9 score.           Passed - Medication is Bupropion     If the medication is Bupropion (Wellbutrin), and the patient is taking for smoking cessation; OK to refill.            Passed - Medication is active on med list        Passed - Patient is age 18 or older        Passed - Recent (6 mo) or future (30 days) visit within the authorizing provider's specialty     Patient had office visit in the last 6 months or has a visit in the next 30 days with authorizing provider or within the authorizing provider's specialty.  See \"Patient Info\" tab in inbasket, or \"Choose Columns\" in Meds & Orders section of the refill encounter.                PHQ 1/12/2021 2/25/2021 4/16/2021   PHQ-9 Total Score 19 7 7   Q9: Thoughts of better off dead/self-harm past 2 weeks Not at all Not at all Not at all         Celia DUPREEN, RN        "

## 2021-12-13 RX ORDER — BUPROPION HYDROCHLORIDE 150 MG/1
TABLET, EXTENDED RELEASE ORAL
Qty: 30 TABLET | Refills: 0 | OUTPATIENT
Start: 2021-12-13

## 2021-12-13 RX ORDER — BUPROPION HYDROCHLORIDE 150 MG/1
150 TABLET, EXTENDED RELEASE ORAL DAILY
Qty: 90 TABLET | Refills: 1 | Status: SHIPPED | OUTPATIENT
Start: 2021-12-13 | End: 2022-05-09

## 2021-12-13 NOTE — TELEPHONE ENCOUNTER
"Called and talk to Rafal and he stated \"It has been going really well, and the increased dose is going well\".     Routing to Provider to review.     Chrissy Gresham RN BSN    "

## 2021-12-13 NOTE — TELEPHONE ENCOUNTER
Please update patient with details from my [employer] message.   i'm waiting on his response to fill the pending buproprion refill

## 2022-02-19 ENCOUNTER — HEALTH MAINTENANCE LETTER (OUTPATIENT)
Age: 37
End: 2022-02-19

## 2022-03-04 DIAGNOSIS — F33.1 MODERATE RECURRENT MAJOR DEPRESSION (H): ICD-10-CM

## 2022-03-04 DIAGNOSIS — F41.9 ANXIETY: ICD-10-CM

## 2022-03-06 ENCOUNTER — NURSE TRIAGE (OUTPATIENT)
Dept: NURSING | Facility: CLINIC | Age: 37
End: 2022-03-06
Payer: COMMERCIAL

## 2022-03-06 DIAGNOSIS — F33.1 MODERATE RECURRENT MAJOR DEPRESSION (H): ICD-10-CM

## 2022-03-06 DIAGNOSIS — F41.9 ANXIETY: ICD-10-CM

## 2022-03-06 NOTE — TELEPHONE ENCOUNTER
Patient is calling and states that he just ran out of his prescription for Sertraline 50 mg tablet.  Pharmacy told patient that he needs MD approval.  Rafal is requesting a message be sent to his PCP as he is requesting a refill for tomorrow.  Patient is requesting to speak with Melissa Prescott.  Please phone patient on Monday March 7th in am.  Pharmacy of choice is [facility]/Pharmacy on [address] in [place], [place].      COVID 19 Nurse Triage Plan/Patient Instructions    Please be aware that novel coronavirus (COVID-19) may be circulating in the community. If you develop symptoms such as fever, cough, or SOB or if you have concerns about the presence of another infection including coronavirus (COVID-19), please contact your health care provider or visit [email or web address].     Disposition/Instructions    Home care recommended. Follow home care protocol based instructions.    Thank you for taking steps to prevent the spread of this virus.  o Limit your contact with others.  o Wear a simple mask to cover your cough.  o Wash your hands well and often.    Resources    [facility]: About COVID-19: [email or web address]    CDC: What to Do If You're Sick: www.cdc.gov/coronavirus/2019-ncov/about/steps-when-sick.html    CDC: Ending Home Isolation: www.cdc.gov/coronavirus/2019-ncov/hcp/disposition-in-home-patients.html     CDC: Caring for Someone: www.cdc.gov/coronavirus/2019-ncov/if-you-are-sick/care-for-someone.html     [facility]: Interim Guidance for Hospital Discharge to Home: www.[email or web address].[email or web address].[email or web address]/diseases/coronavirus/hcp/hospdischarge.pdf    [facility] clinical trials (COVID-19 research studies): [email or web address]     Below are the COVID-19 hotlines at the [place] Department of Health ([facility]). Interpreters are available.   o For health questions: Call 702-878-1500 or 1-580.521.3227 (7 a.m. to 7 p.m.)  o For questions about schools and childcare: Call  661.706.8614 or 1-314.720.8274 (7 a.m. to 7 p.m.)

## 2022-03-07 NOTE — TELEPHONE ENCOUNTER
Routed the Rx refill encounter to Dr. Ramachandran today as well.    Will need to call  Patient back with outcome.    Denise Rolle RN, [facility]

## 2022-03-07 NOTE — TELEPHONE ENCOUNTER
Routing refill request to provider for review/approval because:  PHQ-9 is not within 6 months and over 4.    PHQ-9 score:    PHQ 4/16/2021   PHQ-9 Total Score 7   Q9: Thoughts of better off dead/self-harm past 2 weeks Not at all       Patient is out of medication.    Denise Rolle RN, [facility]

## 2022-07-19 DIAGNOSIS — F33.1 MODERATE RECURRENT MAJOR DEPRESSION (H): ICD-10-CM

## 2022-07-19 DIAGNOSIS — F41.9 ANXIETY: ICD-10-CM

## 2022-07-21 RX ORDER — SERTRALINE HYDROCHLORIDE 100 MG/1
TABLET, FILM COATED ORAL
Qty: 30 TABLET | Refills: 1 | Status: SHIPPED | OUTPATIENT
Start: 2022-07-21 | End: 2022-07-25

## 2022-10-16 ENCOUNTER — HEALTH MAINTENANCE LETTER (OUTPATIENT)
Age: 37
End: 2022-10-16

## 2023-02-08 ENCOUNTER — OFFICE VISIT (OUTPATIENT)
Dept: FAMILY MEDICINE | Facility: CLINIC | Age: 38
End: 2023-02-08
Payer: COMMERCIAL

## 2023-02-08 VITALS
BODY MASS INDEX: 28.03 KG/M2 | DIASTOLIC BLOOD PRESSURE: 98 MMHG | HEIGHT: 73 IN | RESPIRATION RATE: 8 BRPM | HEART RATE: 67 BPM | OXYGEN SATURATION: 99 % | WEIGHT: 211.5 LBS | TEMPERATURE: 98.3 F | SYSTOLIC BLOOD PRESSURE: 140 MMHG

## 2023-02-08 DIAGNOSIS — R19.5 LOOSE STOOLS: ICD-10-CM

## 2023-02-08 DIAGNOSIS — Z13.220 SCREENING CHOLESTEROL LEVEL: ICD-10-CM

## 2023-02-08 DIAGNOSIS — Z00.00 ROUTINE GENERAL MEDICAL EXAMINATION AT A HEALTH CARE FACILITY: Primary | ICD-10-CM

## 2023-02-08 DIAGNOSIS — F41.9 ANXIETY: ICD-10-CM

## 2023-02-08 DIAGNOSIS — F33.1 MODERATE RECURRENT MAJOR DEPRESSION (H): ICD-10-CM

## 2023-02-08 DIAGNOSIS — R03.0 ELEVATED BLOOD PRESSURE READING WITHOUT DIAGNOSIS OF HYPERTENSION: ICD-10-CM

## 2023-02-08 DIAGNOSIS — L72.3 SEBACEOUS CYST: ICD-10-CM

## 2023-02-08 DIAGNOSIS — L21.9 SEBORRHEIC DERMATITIS: ICD-10-CM

## 2023-02-08 DIAGNOSIS — Z11.59 NEED FOR HEPATITIS C SCREENING TEST: ICD-10-CM

## 2023-02-08 DIAGNOSIS — Z11.4 SCREENING FOR HIV (HUMAN IMMUNODEFICIENCY VIRUS): ICD-10-CM

## 2023-02-08 PROBLEM — Z82.79 FAMILY HISTORY OF BICUSPID AORTIC VALVE: Status: ACTIVE | Noted: 2023-02-08

## 2023-02-08 LAB
BASOPHILS # BLD AUTO: 0 10E3/UL (ref 0–0.2)
BASOPHILS NFR BLD AUTO: 0 %
EOSINOPHIL # BLD AUTO: 0.1 10E3/UL (ref 0–0.7)
EOSINOPHIL NFR BLD AUTO: 3 %
ERYTHROCYTE [DISTWIDTH] IN BLOOD BY AUTOMATED COUNT: 12.9 % (ref 10–15)
HCT VFR BLD AUTO: 47.3 % (ref 40–53)
HGB BLD-MCNC: 15.2 G/DL (ref 13.3–17.7)
IMM GRANULOCYTES # BLD: 0 10E3/UL
IMM GRANULOCYTES NFR BLD: 0 %
LYMPHOCYTES # BLD AUTO: 2.2 10E3/UL (ref 0.8–5.3)
LYMPHOCYTES NFR BLD AUTO: 49 %
MCH RBC QN AUTO: 30.6 PG (ref 26.5–33)
MCHC RBC AUTO-ENTMCNC: 32.1 G/DL (ref 31.5–36.5)
MCV RBC AUTO: 95 FL (ref 78–100)
MONOCYTES # BLD AUTO: 0.5 10E3/UL (ref 0–1.3)
MONOCYTES NFR BLD AUTO: 11 %
NEUTROPHILS # BLD AUTO: 1.7 10E3/UL (ref 1.6–8.3)
NEUTROPHILS NFR BLD AUTO: 37 %
PLATELET # BLD AUTO: 227 10E3/UL (ref 150–450)
RBC # BLD AUTO: 4.97 10E6/UL (ref 4.4–5.9)
WBC # BLD AUTO: 4.5 10E3/UL (ref 4–11)

## 2023-02-08 PROCEDURE — 82306 VITAMIN D 25 HYDROXY: CPT | Performed by: FAMILY MEDICINE

## 2023-02-08 PROCEDURE — 99214 OFFICE O/P EST MOD 30 MIN: CPT | Mod: 25 | Performed by: FAMILY MEDICINE

## 2023-02-08 PROCEDURE — 80048 BASIC METABOLIC PNL TOTAL CA: CPT | Performed by: FAMILY MEDICINE

## 2023-02-08 PROCEDURE — 80061 LIPID PANEL: CPT | Performed by: FAMILY MEDICINE

## 2023-02-08 PROCEDURE — 90715 TDAP VACCINE 7 YRS/> IM: CPT | Performed by: FAMILY MEDICINE

## 2023-02-08 PROCEDURE — 84443 ASSAY THYROID STIM HORMONE: CPT | Performed by: FAMILY MEDICINE

## 2023-02-08 PROCEDURE — 36415 COLL VENOUS BLD VENIPUNCTURE: CPT | Performed by: FAMILY MEDICINE

## 2023-02-08 PROCEDURE — 90471 IMMUNIZATION ADMIN: CPT | Performed by: FAMILY MEDICINE

## 2023-02-08 PROCEDURE — 96127 BRIEF EMOTIONAL/BEHAV ASSMT: CPT | Performed by: FAMILY MEDICINE

## 2023-02-08 PROCEDURE — 86803 HEPATITIS C AB TEST: CPT | Performed by: FAMILY MEDICINE

## 2023-02-08 PROCEDURE — 87389 HIV-1 AG W/HIV-1&-2 AB AG IA: CPT | Performed by: FAMILY MEDICINE

## 2023-02-08 PROCEDURE — 99395 PREV VISIT EST AGE 18-39: CPT | Mod: 25 | Performed by: FAMILY MEDICINE

## 2023-02-08 PROCEDURE — 85025 COMPLETE CBC W/AUTO DIFF WBC: CPT | Performed by: FAMILY MEDICINE

## 2023-02-08 RX ORDER — BUPROPION HYDROCHLORIDE 150 MG/1
150 TABLET, EXTENDED RELEASE ORAL DAILY
Qty: 90 TABLET | Refills: 3 | Status: SHIPPED | OUTPATIENT
Start: 2023-02-08 | End: 2024-02-28

## 2023-02-08 RX ORDER — KETOCONAZOLE 20 MG/G
CREAM TOPICAL 2 TIMES DAILY
Qty: 60 G | Refills: 1 | Status: SHIPPED | OUTPATIENT
Start: 2023-02-08 | End: 2023-08-16

## 2023-02-08 RX ORDER — SERTRALINE HYDROCHLORIDE 100 MG/1
100 TABLET, FILM COATED ORAL DAILY
Qty: 90 TABLET | Refills: 3 | Status: SHIPPED | OUTPATIENT
Start: 2023-02-08 | End: 2024-03-08

## 2023-02-08 ASSESSMENT — ENCOUNTER SYMPTOMS
HEMATOCHEZIA: 0
ABDOMINAL PAIN: 0
COUGH: 0
MYALGIAS: 0
PARESTHESIAS: 0
NAUSEA: 0
NERVOUS/ANXIOUS: 1
SORE THROAT: 0
HEARTBURN: 0
FEVER: 0
JOINT SWELLING: 0
SHORTNESS OF BREATH: 0
DIZZINESS: 0
HEADACHES: 0
CHILLS: 0
HEMATURIA: 0
DYSURIA: 0
WEAKNESS: 0
CONSTIPATION: 0
PALPITATIONS: 0
FREQUENCY: 0
EYE PAIN: 0
DIARRHEA: 1
ARTHRALGIAS: 0

## 2023-02-08 ASSESSMENT — PAIN SCALES - GENERAL: PAINLEVEL: NO PAIN (0)

## 2023-02-08 ASSESSMENT — PATIENT HEALTH QUESTIONNAIRE - PHQ9
10. IF YOU CHECKED OFF ANY PROBLEMS, HOW DIFFICULT HAVE THESE PROBLEMS MADE IT FOR YOU TO DO YOUR WORK, TAKE CARE OF THINGS AT HOME, OR GET ALONG WITH OTHER PEOPLE: NOT DIFFICULT AT ALL
SUM OF ALL RESPONSES TO PHQ QUESTIONS 1-9: 10
SUM OF ALL RESPONSES TO PHQ QUESTIONS 1-9: 10

## 2023-02-08 NOTE — PROGRESS NOTES
SUBJECTIVE:   CC: Thiago is an 37 year old who presents for preventative health visit.   Patient has been advised of split billing requirements and indicates understanding: Yes  Healthy Habits:     Getting at least 3 servings of Calcium per day:  NO    Bi-annual eye exam:  NO    Dental care twice a year:  Yes    Sleep apnea or symptoms of sleep apnea:  None    Diet:  Regular (no restrictions)    Frequency of exercise:  2-3 days/week    Duration of exercise:  30-45 minutes    Taking medications regularly:  Yes    PHQ-2 Total Score: 2    Additional concerns today:  Yes      Today's PHQ-2 Score:   PHQ-2 ( 1999 Pfizer) 2/8/2023   Q1: Little interest or pleasure in doing things -   Q2: Feeling down, depressed or hopeless -   PHQ-2 Score -   PHQ-2 Total Score (12-17 Years)- Positive if 3 or more points; Administer PHQ-A if positive -   PHQ-2 Score Incomplete       Have you ever done Advance Care Planning? (For example, a Health Directive, POLST, or a discussion with a medical provider or your loved ones about your wishes): No, advance care planning information given to patient to review.  Patient declined advance care planning discussion at this time.    Social History     Tobacco Use     Smoking status: Former     Smokeless tobacco: Never   Substance Use Topics     Alcohol use: Yes     Comment: maybe 5-10 drinks per week         Last PSA: No results found for: PSA    Reviewed orders with patient. Reviewed health maintenance and updated orders accordingly - Yes      Reviewed and updated as needed this visit by clinical staff                  Reviewed and updated as needed this visit by Provider                   Review of Systems   Constitutional: Negative for chills and fever.   HENT: Negative for congestion, ear pain, hearing loss and sore throat.    Eyes: Negative for pain and visual disturbance.   Respiratory: Negative for cough and shortness of breath.    Cardiovascular: Negative for chest pain, palpitations and  peripheral edema.   Gastrointestinal: Positive for diarrhea. Negative for abdominal pain, constipation, heartburn, hematochezia and nausea.   Genitourinary: Negative for dysuria, frequency, genital sores, hematuria, impotence, penile discharge and urgency.   Musculoskeletal: Negative for arthralgias, joint swelling and myalgias.   Skin: Positive for rash.   Neurological: Negative for dizziness, weakness, headaches and paresthesias.   Psychiatric/Behavioral: Negative for mood changes. The patient is nervous/anxious.      Was drinking more last year but now cut back in efforts to try to be healthier..  Does not feel it was a problem and currently drinks about 5 drinks per week on average.    Over the last 3 weeks missing doses of of anti-depressants. Think taking about 4-5 doses per week. No withdrawel sx's.  Overall, has been feeling better lately.  Mood has been improved  Feel less down, less grey periods. Feel this way for the last month.   Want to stay on current dosing and do better job of taking meds better.   Was told had adhd as child in elementary school.     Had labs drawn when got life insurance in 2020. Told all normal range.     Weight is up a bit in the last 6 months. bp tends to increase with wt gain.   Normally exercise 3-5 x's a week but got out of this routine last year.   Less motivation and energy in this last year. Feel like this mostly in this normal ebb and flow    Low risk for hep c and HiV.  Thinks these were may be drawn with his insurance labs but is not positive.    Dry skin around eyes and and in side burn area, redness. occ will get on chest. No sx's associated with it.     Sebaceous cyst scalp, left arm that are getting big. Has had some removed in the past.  Is interested in seeing dermatology again for this.    Wonders if he has IBS. Will have phases of intermittent diarrhea  ? Spicy foods occ trigger.   Watery to loose. No abd cramping/pain. Up to 2 stools per day.   No blood or  "mucous in the stools.         OBJECTIVE:   BP (!) 140/89 (BP Location: Right arm, Patient Position: Sitting, Cuff Size: Adult Large)   Pulse 67   Temp 98.3  F (36.8  C) (Oral)   Resp (!) 8   Ht 1.852 m (6' 0.91\")   Wt 95.9 kg (211 lb 8 oz)   SpO2 99%   BMI 27.97 kg/m      Physical Exam  GENERAL: healthy, alert and no distress  EYES: Eyes grossly normal to inspection, PERRL and conjunctivae and sclerae normal  HENT: ear canals and TM's normal, nose and mouth without ulcers or lesions  NECK: no adenopathy, no asymmetry, masses, or scars and thyroid normal to palpation  RESP: lungs clear to auscultation - no rales, rhonchi or wheezes  CV: regular rate and rhythm, normal S1 S2, no S3 or S4, no murmur, click or rub, no peripheral edema and peripheral pulses strong  ABDOMEN: soft, nontender, no hepatosplenomegaly, no masses and bowel sounds normal   (male): normal male genitalia without lesions or urethral discharge, no hernia  MS: no gross musculoskeletal defects noted, no edema  SKIN: no suspicious lesions.  Erythematous papular rash center chest.  1 to 2 cm subcutaneous round smooth nodules noted at anterior hairline on scalp and left upper outer arm  NEURO: Normal strength and tone, mentation intact and speech normal  PSYCH: mentation appears normal, affect normal/bright      ASSESSMENT/PLAN:   (Z00.00) Routine general medical examination at a health care facility  (primary encounter diagnosis)    (F41.9) Anxiety  (F33.1) Moderate recurrent major depression (H)  Comment: Overall is doing better than he has in the past.  We will work on taking his medications more faithfully.  He would like to stay on current dosing  Plan: buPROPion (WELLBUTRIN SR) 150 MG 12 hr tablet,         sertraline (ZOLOFT) 100 MG tablet, TSH with         free T4 reflex, CBC with platelets and         differential, Vitamin D Deficiency        Continue current medications.    (L72.3) Sebaceous cyst  Comment: Recurrent.  He is interested " in dermatology for removal  Plan: Adult Dermatology Referral           (R19.5) Loose stools  Comment: Fairly intermittent and not associated with a lot of other symptoms.  Plan: We discussed certainly foods, stress, etc. could be impacting this.  We will trial a course of fiber and trial of dairy free diet to see if this makes a difference.  Certainly if diarrhea is escalating or persistent we could further evaluate with labs, colonoscopy, etc.    (R03.0) Elevated blood pressure reading without diagnosis of hypertension  Comment: Reports generally his blood pressure is better controlled when his weight is down and he is exercising more.  Plan: Basic metabolic panel  (Ca, Cl, CO2, Creat,         Gluc, K, Na, BUN)        Blood pressure today was very borderline.  He wants to continue to work on lifestyle measures.  However I note his blood pressure on checkout was more elevated so I will certainly have him follow-up if it continues to be high.    (Z13.220) Screening cholesterol level  Comment:   Plan: Lipid panel reflex to direct LDL Non-fasting            (L21.9) Seborrheic dermatitis  Comment: Based on description of rash on his face I suspect he has seborrheic dermatitis (although it is not flared currently).  Plan: ketoconazole (NIZORAL) 2 % external cream        We will trial ketoconazole cream and certainly can follow-up with during for further evaluation if needed    (Z11.4) Screening for HIV (human immunodeficiency virus)  Comment:   Plan: HIV Antigen Antibody Combo            (Z11.59) Need for hepatitis C screening test  Comment:   Plan: Hepatitis C Screen Reflex to HCV RNA Quant and         Genotype                COUNSELING:   Reviewed preventive health counseling, as reflected in patient instructions       Regular exercise       Healthy diet/nutrition       Vision screening       Consider Hep C screening for all patients one time for ages 18-79 years       HIV screeninx in teen years, 1x in adult  years, and at intervals if high risk       Colorectal cancer screening       Prostate cancer screening        He reports that he has quit smoking. He has never used smokeless tobacco.            Melissa Ramachandran MD  [facility]  Answers for HPI/ROS submitted by the patient on 2/8/2023  If you checked off any problems, how difficult have these problems made it for you to do your work, take care of things at home, or get along with other people?: Not difficult at all  PHQ9 TOTAL SCORE: 10

## 2023-02-08 NOTE — PATIENT INSTRUCTIONS
Start powdered pyllium fiber such as Metamucil or Citrucel: start 1 tsp per day (mixed with fluid), and increase by 1 tsp per week to goal total dosing of 1-2 tablespoons per day. Drink plenty of water daily (at least 40-60 oz) for fiber to be effective.     Trial of dairy free diet.         Preventive Health Recommendations  Male Ages 26 - 39    Yearly exam:             See your health care provider every year in order to  o   Review health changes.   o   Discuss preventive care.    o   Review your medicines if your doctor has prescribed any.  You should be tested each year for STDs (sexually transmitted diseases), if you re at risk.   After age 35, talk to your provider about cholesterol testing. If you are at risk for heart disease, have your cholesterol tested at least every 5 years.   If you are at risk for diabetes, you should have a diabetes test (fasting glucose).  Shots: Get a flu shot each year. Get a tetanus shot every 10 years.     Nutrition:  Eat at least 5 servings of fruits and vegetables daily.   Eat whole-grain bread, whole-wheat pasta and brown rice instead of white grains and rice.   Get adequate Calcium and Vitamin D.     Lifestyle  Exercise for at least 150 minutes a week (30 minutes a day, 5 days a week). This will help you control your weight and prevent disease.   Limit alcohol to one drink per day.   No smoking.   Wear sunscreen to prevent skin cancer.   See your dentist every six months for an exam and cleaning.

## 2023-02-09 LAB
ANION GAP SERPL CALCULATED.3IONS-SCNC: 2 MMOL/L (ref 3–14)
BUN SERPL-MCNC: 13 MG/DL (ref 7–30)
CALCIUM SERPL-MCNC: 9.9 MG/DL (ref 8.5–10.1)
CHLORIDE BLD-SCNC: 107 MMOL/L (ref 94–109)
CHOLEST SERPL-MCNC: 187 MG/DL
CO2 SERPL-SCNC: 33 MMOL/L (ref 20–32)
CREAT SERPL-MCNC: 0.91 MG/DL (ref 0.66–1.25)
FASTING STATUS PATIENT QL REPORTED: NO
GFR SERPL CREATININE-BSD FRML MDRD: >90 ML/MIN/1.73M2
GLUCOSE BLD-MCNC: 105 MG/DL (ref 70–99)
HDLC SERPL-MCNC: 64 MG/DL
LDLC SERPL CALC-MCNC: 102 MG/DL
NONHDLC SERPL-MCNC: 123 MG/DL
POTASSIUM BLD-SCNC: 4.8 MMOL/L (ref 3.4–5.3)
SODIUM SERPL-SCNC: 142 MMOL/L (ref 133–144)
TRIGL SERPL-MCNC: 106 MG/DL
TSH SERPL DL<=0.005 MIU/L-ACNC: 1.53 MU/L (ref 0.4–4)

## 2023-02-10 LAB
DEPRECATED CALCIDIOL+CALCIFEROL SERPL-MC: 22 UG/L (ref 20–75)
HCV AB SERPL QL IA: NONREACTIVE
HIV 1+2 AB+HIV1 P24 AG SERPL QL IA: NONREACTIVE

## 2023-02-10 NOTE — RESULT ENCOUNTER NOTE
Rafal,  It was a pleasure to see you in the office recently.   -Your thyroid test was normal.  -Cholesterol panel overall looks pretty good. The LDL (bad cholesterol) is fine for your age and cardiovascular risks.    - The metabolic (kidney and electrolyte) panel was fine. The glucose was okay as you were not fasting at the time of the lab draw. The carbon dioxide levels and anion gap are fine and not clinically significant.  - the blood count panel was normal.   A few more labs are still processing and I will update you with those results when they return.  Please MyChart or call if you have any concerns or questions.   Sincerely,  Melissa Ramachandran MD

## 2023-02-13 NOTE — RESULT ENCOUNTER NOTE
Rafal,  -Your vitamin D level is lower range of normal.  I would suggest starting a daily vitamin D3 supplement to keep this level up in a more normal range.  I would suggest dosing at 1000 international units (25 mcg) per day.  -The screening test for HIV and hepatitis C viral infections were both negative.  Please MyChart or call if you have any concerns or questions.   Sincerely,  Melissa Ramachandran MD

## 2023-03-13 ENCOUNTER — OFFICE VISIT (OUTPATIENT)
Dept: DERMATOLOGY | Facility: CLINIC | Age: 38
End: 2023-03-13
Payer: COMMERCIAL

## 2023-03-13 DIAGNOSIS — L72.0 EIC (EPIDERMAL INCLUSION CYST): Primary | ICD-10-CM

## 2023-03-13 PROCEDURE — 99203 OFFICE O/P NEW LOW 30 MIN: CPT | Performed by: DERMATOLOGY

## 2023-03-13 NOTE — PROGRESS NOTES
[facility] Dermatology Note  Encounter Date: Mar 13, 2023  Office Visit     Dermatology Problem List:  1. History of cyst removal  - frontal scalp, s/p excision 7/2/13  2. EIC - scalp x 2, left upper arm x 1 ---> pending excision    ____________________________________________    Assessment & Plan:    # Cystic nodules - scalp x 2, left upper arm x 1. Ddx EIC versus pilar cysts. Pt reports have been growing and becoming more bothersome. Discussed surgical excision for removal, discussed risks and scar development. Discussed laser treatment can be performed if the scar is cosmetically bothersome. After discussion, patient elects for excision.   - Will schedule with derm surgery for excision.        Procedures Performed:   None.     Follow-up: With derm surgery for excision of cysts    Staff and Scribe:     Scribe Disclosure:   I, Osmani Lester, am serving as a scribe to document services personally performed by this physician, Dr. Rafal Thayer, based on data collection and the provider's statements to me.     Provider Disclosure:   The documentation recorded by the scribe accurately reflects the services I personally performed and the decisions made by me.    Rafal Thayer MD    Department of Dermatology  [facility]: Phone: [phone], Fax:[phone]  [facility]: Phone: [phone] Fax: [phone]  ____________________________________________    CC: Derm Problem (Patient here for multiple cysts on body, one on left shoulder two on top of head.)    HPI:  Mr. Rafal Spears is a(n) 37 year old male who presents today as a new patient for a spot check.    Referred to derm for numerous sebaceous cysts on the scalp and left upper arm.    Today, he reports several cysts on the torso, left shoulder, and scalp. He reports he has had several removed previously, the last  in 2013. He reports the cysts are not tender or painful now, but they are growing and will become bothersome. The cysts in the scalp are irritated when combing his hair.    The patient otherwise denies any new or concerning lesions. No bleeding, painful, pruritic, or changing lesions. Health otherwise stable. No other skin concerns.       Labs Reviewed:  N/A    Physical Exam:  Vitals: There were no vitals taken for this visit.  SKIN: Focused examination of scalp, left shoulder was performed.  - 3 cm x 3 cm cystic nodule on the left upper arm.  - 2 cm x 1.5 cm cystic nodule on the frontal hairline.  - 1.5 cm x 1.2 cm cystic nodule on the left temporal scalp.   - No other lesions of concern on areas examined.               Medications:  Current Outpatient Medications   Medication     buPROPion (WELLBUTRIN SR) 150 MG 12 hr tablet     ketoconazole (NIZORAL) 2 % external cream     sertraline (ZOLOFT) 100 MG tablet     No current facility-administered medications for this visit.      Past Medical History:   Patient Active Problem List   Diagnosis     Elevated blood pressure reading without diagnosis of hypertension     Moderate recurrent major depression (H)     Anxiety     Family history of bicuspid aortic valve     Past Medical History:   Diagnosis Date     Depressive disorder 2020    Currently taking medication.     Hypertension 2015    Borderline, has been better recently.        CC Melissa Ramachandran MD  [address]  [place] [address] on close of this encounter.

## 2023-03-13 NOTE — LETTER
3/13/2023         RE: Rafal Spears  [address]  [place] [place] [facility] [address]        Dear Colleague,    Thank you for referring your patient, Rafal Spears, to the [facility]. Please see a copy of my visit note below.    [facility] Dermatology Note  Encounter Date: Mar 13, 2023  Office Visit     Dermatology Problem List:  1. History of cyst removal  - frontal scalp, s/p excision 7/2/13  2. EIC - scalp x 2, left upper arm x 1 ---> pending excision    ____________________________________________    Assessment & Plan:    # Cystic nodules - scalp x 2, left upper arm x 1. Ddx EIC versus pilar cysts. Pt reports have been growing and becoming more bothersome. Discussed surgical excision for removal, discussed risks and scar development. Discussed laser treatment can be performed if the scar is cosmetically bothersome. After discussion, patient elects for excision.   - Will schedule with derm surgery for excision.        Procedures Performed:   None.     Follow-up: With derm surgery for excision of cysts    Staff and Scribe:     Scribe Disclosure:   I, Osmani Lester, am serving as a scribe to document services personally performed by this physician, Dr. Rafal Thayer, based on data collection and the provider's statements to me.     Provider Disclosure:   The documentation recorded by the scribe accurately reflects the services I personally performed and the decisions made by me.    Rafal Thayer MD    Department of Dermatology  [facility]: Phone: [phone], Fax:[phone]  [facility]: Phone: [phone] Fax: [phone]  ____________________________________________    CC: Derm Problem (Patient here for multiple cysts on body, one on left shoulder two on top of head.)    HPI:  Mr. Rafal A Spears is a(n) 37 year old male who presents  today as a new patient for a spot check.    Referred to derm for numerous sebaceous cysts on the scalp and left upper arm.    Today, he reports several cysts on the torso, left shoulder, and scalp. He reports he has had several removed previously, the last in 2013. He reports the cysts are not tender or painful now, but they are growing and will become bothersome. The cysts in the scalp are irritated when combing his hair.    The patient otherwise denies any new or concerning lesions. No bleeding, painful, pruritic, or changing lesions. Health otherwise stable. No other skin concerns.       Labs Reviewed:  N/A    Physical Exam:  Vitals: There were no vitals taken for this visit.  SKIN: Focused examination of scalp, left shoulder was performed.  - 3 cm x 3 cm cystic nodule on the left upper arm.  - 2 cm x 1.5 cm cystic nodule on the frontal hairline.  - 1.5 cm x 1.2 cm cystic nodule on the left temporal scalp.   - No other lesions of concern on areas examined.               Medications:  Current Outpatient Medications   Medication     buPROPion (WELLBUTRIN SR) 150 MG 12 hr tablet     ketoconazole (NIZORAL) 2 % external cream     sertraline (ZOLOFT) 100 MG tablet     No current facility-administered medications for this visit.      Past Medical History:   Patient Active Problem List   Diagnosis     Elevated blood pressure reading without diagnosis of hypertension     Moderate recurrent major depression (H)     Anxiety     Family history of bicuspid aortic valve     Past Medical History:   Diagnosis Date     Depressive disorder 2020    Currently taking medication.     Hypertension 2015    Borderline, has been better recently.        CC Melissa Ramachandran MD  [address] [place] [address] [place] [address] on close of this encounter.      Again, thank you for allowing me to participate in the care of your patient.        Sincerely,        Rafal Thayer MD

## 2023-03-13 NOTE — NURSING NOTE
Rafal Spears's goals for this visit include:   Chief Complaint   Patient presents with     Derm Problem     Patient here for multiple cysts on body, one on left shoulder two on top of head.       He requests these members of his care team be copied on today's visit information:    PCP: [facility] [place], [facility]    Referring Provider:  Melissa Ramachandran MD  [address] [place] [address]    There were no vitals taken for this visit.    Do you need any medication refills at today's visit? No         Sherlyn Ross EMT

## 2023-04-17 ENCOUNTER — NURSE TRIAGE (OUTPATIENT)
Dept: NURSING | Facility: CLINIC | Age: 38
End: 2023-04-17
Payer: COMMERCIAL

## 2023-04-17 ENCOUNTER — OFFICE VISIT (OUTPATIENT)
Dept: URGENT CARE | Facility: URGENT CARE | Age: 38
End: 2023-04-17
Payer: COMMERCIAL

## 2023-04-17 VITALS
DIASTOLIC BLOOD PRESSURE: 99 MMHG | OXYGEN SATURATION: 97 % | WEIGHT: 215.8 LBS | BODY MASS INDEX: 28.54 KG/M2 | SYSTOLIC BLOOD PRESSURE: 155 MMHG | HEART RATE: 71 BPM | TEMPERATURE: 97.6 F

## 2023-04-17 DIAGNOSIS — T23.262A PARTIAL THICKNESS BURN OF BACK OF LEFT HAND, INITIAL ENCOUNTER: Primary | ICD-10-CM

## 2023-04-17 DIAGNOSIS — L08.9 LOCAL INFECTION OF SKIN AND SUBCUTANEOUS TISSUE: ICD-10-CM

## 2023-04-17 PROCEDURE — 99213 OFFICE O/P EST LOW 20 MIN: CPT

## 2023-04-17 RX ORDER — SILVER SULFADIAZINE 10 MG/G
CREAM TOPICAL 2 TIMES DAILY
Qty: 50 G | Refills: 0 | Status: SHIPPED | OUTPATIENT
Start: 2023-04-17 | End: 2023-08-16

## 2023-04-17 RX ORDER — CEPHALEXIN 500 MG/1
500 CAPSULE ORAL 2 TIMES DAILY
Qty: 14 CAPSULE | Refills: 0 | Status: SHIPPED | OUTPATIENT
Start: 2023-04-17 | End: 2023-04-24
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
## 2023-04-17 NOTE — PATIENT INSTRUCTIONS
Use the silvadene cream as prescribed.  Take the antibiotic as prescribed and finish the full course of antibiotics even if symptoms improve.  Try yogurt with active cultures or probiotics such as Culturelle daily to help prevent diarrhea while using antibiotics.  Monitor for any increase in redness, swelling, drainage, bleeding, warmth and/or fever/chills.  Return to clinic if any of these occur.
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
## 2023-05-03 ENCOUNTER — TELEPHONE (OUTPATIENT)
Dept: DERMATOLOGY | Facility: CLINIC | Age: 38
End: 2023-05-03
Payer: COMMERCIAL

## 2023-05-03 NOTE — TELEPHONE ENCOUNTER
I advised patient to plan on being in clinic for ~1 hr.  I advised patient that they don't need to be fasting and they can take medications as scheduled.  I reviewed that he will have a pressure bandage on the arm for 48 hrs following procedure and that we don't want this to get wet.  I advised him to wash his hair the night before or morning of the procedure and to refrain from applying any hair product.  No physical activity for 48 hrs.  I reviewed that following the 48 hrs they will begin daily wound care for 2 weeks, but should not submerge the wound in standing water such as a bathtub, pool, hot tub, etc.     Patient verbalized understanding and agreed with the plan.     Excision/Mohs previsit information                                                    Diagnosis: EIC  Site(s): scalp and left upper arm    Over the counter Chlorhexidine surgical soap to wash all skin below the belly button twice before surgery should be recommended for the following:  - Surgical sites below the waist  - Immunosuppressed  - Previous surgical site infection  - Anticipated wound care challenges    Medication & Allergy Information                                                      Review and update allergy and medication list.    Do you take the following medications:    Coumadin, Eliquis, Pradaxa, Xarelto:  NO   -If on Coumadin, INR should be checked within 7 days of surgery.  Range should be 3.5 or less or within therapeutic range.    Past Medical History                                                    Do you currently or have you previously had any of the following conditions:      Hepatitis:  NO    HIV/AIDS:  NO    Prolonged bleeding or bleeding disorder:  NO    Pacemaker or Defibrillator:  NO.      History of artificial or heart valve replacement:  NO    Endocarditis (inflammation of the inner lining of the heart's chambers and valves):  NO    Have you ever had a prosthetic joint infection:  NO    Pregnant or  Breastfeeding:  N/A    Mobility device (wheelchair, transfer difficulty): NO    Important Reminders:                                                        Ok to take all of their medications as prescribed    Patients can eat, no need to be fasting    Patient will not be able to get the site wet for 48 hrs    No submerging wound in standing water (lake, pool, bathtub, hot tub) for 2 weeks    No physical activity for 48 hrs (further restrictions will be discussed by MD at time of visit)    Jen Davies RN

## 2023-05-23 ENCOUNTER — OFFICE VISIT (OUTPATIENT)
Dept: DERMATOLOGY | Facility: CLINIC | Age: 38
End: 2023-05-23
Payer: COMMERCIAL

## 2023-05-23 VITALS — DIASTOLIC BLOOD PRESSURE: 99 MMHG | HEART RATE: 73 BPM | SYSTOLIC BLOOD PRESSURE: 159 MMHG

## 2023-05-23 DIAGNOSIS — L72.0 EPIDERMOID CYST: Primary | ICD-10-CM

## 2023-05-23 DIAGNOSIS — L72.0 EIC (EPIDERMAL INCLUSION CYST): ICD-10-CM

## 2023-05-23 DIAGNOSIS — L72.11 PILAR CYST: ICD-10-CM

## 2023-05-23 PROCEDURE — 12032 INTMD RPR S/A/T/EXT 2.6-7.5: CPT | Performed by: DERMATOLOGY

## 2023-05-23 PROCEDURE — 88304 TISSUE EXAM BY PATHOLOGIST: CPT | Performed by: DERMATOLOGY

## 2023-05-23 PROCEDURE — 13132 CMPLX RPR F/C/C/M/N/AX/G/H/F: CPT | Performed by: DERMATOLOGY

## 2023-05-23 PROCEDURE — 11422 EXC H-F-NK-SP B9+MARG 1.1-2: CPT | Performed by: DERMATOLOGY

## 2023-05-23 PROCEDURE — 13121 CMPLX RPR S/A/L 2.6-7.5 CM: CPT | Performed by: DERMATOLOGY

## 2023-05-23 PROCEDURE — 11443 EXC FACE-MM B9+MARG 2.1-3 CM: CPT | Performed by: DERMATOLOGY

## 2023-05-23 PROCEDURE — 11403 EXC TR-EXT B9+MARG 2.1-3CM: CPT | Performed by: DERMATOLOGY

## 2023-05-23 ASSESSMENT — PAIN SCALES - GENERAL: PAINLEVEL: NO PAIN (0)

## 2023-05-23 NOTE — PROCEDURES
DERMATOLOGIC EXCISION SURGERY PROCEDURE NOTE     Dermatology Problem List:  Cyst x 3    NAME OF PROCEDURE: Excision with intermediate linear closure  Staff surgeon: Paul Gomez MD  Resident: n/a  Scrub nurse: Janny Bui CMA    PRE-OPERATIVE DIAGNOSIS:  cyst  POST-OPERATIVE DIAGNOSIS: Same   LOCATION: left upper arm  FINAL EXCISION SIZE (DEFECT SIZE): 2.5 cm  MARGIN: 0 cm  FINAL REPAIR LENGTH: 4.2 cm   ANESTHESIA: 9mL 1% lidocaine with 1:100,000 epinephrine    INDICATIONS: This patient presented with a 2.5cm cyst.. Excision was indicated.   We discussed the principles of treatment and most likely complications including bleeding, infection, scarring, alteration in skin color and sensation, wound dehiscence,muscle weakness in the area, or recurrence of the lesion or disease. We reviewed that on occasion, after healing, a secondary procedure or revision may be recommended in order to obtain the best cosmetic or functional result.   PROCEDURE: The patient was taken to the operative suite. Time-out was performed. The treatment area was anesthetized. The area was washed with Hibiclens, rinsed with saline and draped with sterile towels. The lesion was delineated and excised down to deep subcutaneous fat in a fusiform manner. Hemostasis was obtained by electrocoagulation.   REPAIR: A intermediate layered linear closure was selected as the procedure which would maximally preserve both function and cosmesis.  After the excision of the tumor, the area was extensively and carefully undermined using blunt Metzenbaum scissors. Hemostasis was obtained with spot electrocautery and ligation of vessels where necessary. An initial deep plication sutures of 4-0 monocryl sutures  were placed in the deep, subcutaneous and fascial planes to appose the lateral margins.  The subcutaneous and dermal layers were then closed with additional 4-0 monocryl sutures. The epidermis was then carefully approximated along the length of the wound using  4-0 monocryl running subcuticular sutures.   Estimated blood loss was less than 10 ml for all surgical sites. A sterile pressure dressing was applied and wound care instructions, with a written handout, were given. The patient was discharged from the [facility] alert and ambulatory.  The patient elected for pathology results to automatically release and understands that the clinical staff will contact them as soon as possible to notify them of the results.   Follow up dermatology clinic with MD in [place].    Dr. Gomez staffed the patient and was present for the key portions of the above procedure.   Staff Involved:  Staff Only    DERMATOLOGIC EXCISION SURGERY PROCEDURE NOTE         NAME OF PROCEDURE: Excision with complex linear closure  Staff surgeon: Jason  Resident: none  Scrub nurse: Hao    PRE-OPERATIVE DIAGNOSIS:  cyst  POST-OPERATIVE DIAGNOSIS: Same   LOCATION: superior forehead  FINAL EXCISION SIZE(DEFECT SIZE): 2.3 cm  MARGIN: 0 cm  FINAL REPAIR LENGTH: 3 cm   ANESTHESIA: 6cc 1% lidocaine with 1:100,000 epinephrine    INDICATIONS: This patient presented with a 2.3cm cyst.. Excision was indicated.   We discussed the principles of treatment and most likely complications including bleeding, infection, scarring, alteration in skin color and sensation, wound dehiscence,muscle weakness in the area, or recurrence of the lesion or disease. We reviewed that on occasion, after healing, a secondary procedure or revision may be recommended in order to obtain the best cosmetic or functional result.   PROCEDURE: The patient was taken to the operative suite. Time-out was performed. The treatment area was anesthetized. The area was washed with Hibiclens, rinsed with saline and draped with sterile towels. The lesion was delineated and excised down to pericranium in a fusiform manner. Hemostasis was obtained by electrocoagulation.   REPAIR: A complex layered linear closure was selected as the procedure which  would maximally preserve both function and cosmesis and cover the exposed bone: 1) the defect was widely undermined; 2) multiple deep plication and layered sutures placed; 3) wound size, depth, tension, and location.   After the excision of the tumor, the area was extensively and carefully undermined using blunt Metzenbaum scissors. Hemostasis was obtained with spot electrocautery and ligation of vessels where necessary. An initial deep plication sutures of 4.0 monocryl sutures  were placed in the deep,  fascial planes to appose the lateral margins.  The subcutaneous and dermal layers were then closed with additional monocryl sutures. The epidermis was then carefully approximated along the length of the wound using 5.0 FAG running sutures.   Estimated blood loss was less than 10 ml for all surgical sites. A sterile pressure dressing was applied and wound care instructions, with a written handout, were given. The patient was discharged from the [facility] alert and ambulatory.  The patient elected for pathology results to automatically release and understands that the clinical staff will contact them as soon as possible to notify them of the results.   Follow up for suture removal in n/a and in dermatology clinic with MD in [place].    Dr. Gomez staffed the patient and was present for the key portions of the above procedure.   Staff Involved:  Staff Only    NAME OF PROCEDURE: Excision with complex linear closure  Staff surgeon: Jason  Resident: none  Scrub nurse: Hao    PRE-OPERATIVE DIAGNOSIS:  cyst  POST-OPERATIVE DIAGNOSIS: Same   LOCATION: left scalp  FINAL EXCISION SIZE(DEFECT SIZE): 1.5 cm  MARGIN: 0 cm  FINAL REPAIR LENGTH: 3 cm   ANESTHESIA: 6cc 1% lidocaine with 1:100,000 epinephrine    INDICATIONS: This patient presented with a 1.5cm cyst.. Excision was indicated.   We discussed the principles of treatment and most likely complications including bleeding, infection, scarring, alteration in  skin color and sensation, wound dehiscence,muscle weakness in the area, or recurrence of the lesion or disease. We reviewed that on occasion, after healing, a secondary procedure or revision may be recommended in order to obtain the best cosmetic or functional result.   PROCEDURE: The patient was taken to the operative suite. Time-out was performed. The treatment area was anesthetized. The area was washed with Hibiclens, rinsed with saline and draped with sterile towels. The lesion was delineated and excised down to deep fascia in a fusiform manner. Hemostasis was obtained by electrocoagulation.   REPAIR: A complex layered linear closure was selected as the procedure which would maximally preserve both function and cosmesis and fcover the exposed posterior branch of the STA: 1) the defect was widely undermined; 2) multiple deep plication and layered sutures placed; 3) wound size, depth, tension, and location.   After the excision of the tumor, the area was extensively and carefully undermined using blunt Metzenbaum scissors. Hemostasis was obtained with spot electrocautery and ligation of vessels where necessary. An initial deep plication sutures of 4.0 monocryl sutures  were placed in the deep, subcutaneous and fascial planes to appose the lateral margins.  The subcutaneous and dermal layers were then closed with additional monocryl sutures. The epidermis was then carefully approximated along the length of the wound using 5.0 FAG running sutures.   Estimated blood loss was less than 10 ml for all surgical sites. A sterile pressure dressing was applied and wound care instructions, with a written handout, were given. The patient was discharged from the [facility] Center alert and ambulatory.  The patient elected for pathology results to automatically release and understands that the clinical staff will contact them as soon as possible to notify them of the results.   Follow up for suture removal in n/a and  in dermatology clinic with MD in [place].    Dr. Gomez staffed the patient and was present for the key portions of the above procedure.   Staff Involved:  Staff Only

## 2023-05-23 NOTE — LETTER
5/23/2023         RE: Rafal Spears  [address]  [facility] [address]        Dear Colleague,    Thank you for referring your patient, Rafal Spears, to the [facility]. Please see a copy of my visit note below.    See other note.      Again, thank you for allowing me to participate in the care of your patient.        Sincerely,        Paul Gomez MD

## 2023-05-23 NOTE — PATIENT INSTRUCTIONS
Left upper arm - Excision Wound Care Instructions with Steri-Strips    Possible complications of any surgical procedure are bleeding, infection, scarring, alteration in skin color and sensation, muscle weakness in the area, wound dehiscence or seperation, or recurrence of the lesion or disease. On occasion, after healing, a secondary procedure or revision may be recommended in order to obtain the best cosmetic or functional result.     After your surgery, a pressure bandage will be placed over the area that has sutures. This will help prevent bleeding. Please, follow these instructions as they will help you to prevent complications as your wound heals.    For the First 48 hours After Surgery:    Leave the pressure bandage on and keep it dry. If it should come loose, you may retape it, but do not take it off.    Relax and take it easy. Do not do any vigorous exercise, heavy lifting, or bending forward. This could cause the wound to bleed.    Post-operative pain is usually mild. You may alternate between 1000 mg of Tylenol (acetaminophen) and 400 mg of Ibuprofen every 4 hours.  Do not take more than 4,000 mg of acetaminophen and more than 3200 mg of Ibuprofen in a 24 hr period.  Avoid alcohol and vitamin E as these may increase your tendency to bleed.    You may put an ice pack around the bandaged area for 20 minutes every 2-3 hours. This may help reduce swelling, bruising, and pain. Make sure the ice pack is waterproof so that the pressure bandage does not get wet.     If your bandage is saturated with blood apply firm pressure over the bandage with a washcloth for 15 minutes. If bleeding continues after applying pressure for 15 minutes then go to the nearest emergency room.      48 Hours After Surgery:    Remove outer white bandage down to clear plastic film (Tegaderm).  You may notice dark brown, dried blood under the Tegaderm.  This is normal.    Leave the clear plastic film (Tegaderm) on for up to 2 weeks, as  long as it is intact.  If it falls off prior to 2 weeks follow daily wound care below.  If it stays intact for the full 2 weeks, then remove and treat as normal, healthy skin.      Daily Wound Care (if Tegaderm and Steri-Strips fall off prior to 2 weeks):    Wash wound with a mild soap and water.  Use caution when washing the wound, be gentle and do not let the forceful shower stream hit the wound directly. DO NOT WASH WITH HYDROGEN PEROXIDE AS THIS MIGHT CAUSE THE STITCHES TO DISSOLVE FASTER THAN WHAT WE WANT.    Pat dry.    Apply Vaseline (from a new container or tube) over the suture line with a Q-tip until it is completely healed. It is very important to keep the wound continuously moist, as wounds heal best in a moist environment.    Keep the site covered until it's healed.  You can cover it with a Telfa (non-stick) dressing and tape or a band-aid until healed with normal, healthy skin.      Call Us If:    You have pain that is not controlled with Tylenol/Ibuprofen.    You have signs or symptoms of an infection, such as: fever over 100 degrees F, redness, warmth, or foul-smelling or yellow/creamy drainage from the wound.      Who should I call with questions?  [facility]: [phone]  [facility]: [phone]  For urgent needs outside of business hours call the [facility] at [phone] and ask for the dermatology resident on call       Scalp x 2 - Excision Wound Care Instructions  I will experience scar, altered skin color, bleeding, swelling, pain, crusting and redness. I may experience altered sensation. Risks are excessive bleeding, infection, muscle weakness, thick (hypertrophic or keloidal) scar, and recurrence. A second procedure may be recommended to obtain the best cosmetic or functional result.  Possible complications of any surgical procedure are bleeding, infection, scarring, alteration in skin color and sensation, muscle  weakness in the area, wound dehiscence or seperation, or recurrence of the lesion or disease. On occasion, after healing, a secondary procedure or revision may be recommended in order to obtain the best cosmetic or functional result.   After your surgery, a pressure bandage will be placed over the area that has sutures. This will help prevent bleeding. Please follow these instructions as they will help you to prevent complications as your wound heals.  For the First 48 hours After Surgery:  Leave the pressure bandage on and keep it dry. If it should come loose, you may retape it, but do not take it off.  Relax and take it easy. Do not do any vigorous exercise, heavy lifting, or bending forward. This could cause the wound to bleed.  Post-operative pain is usually mild. You may alternate between 1000 mg of Tylenol (acetaminophen) and 400 mg of Ibuprofen every 4 hours.  Do not take more than 4,000mg of acetaminophen in a 24 hour period or 3200 mg of Ibuprofen in a 24 hr period.  Avoid alcohol and vitamin E as these may increase your tendency to bleed.  You may put an ice pack around the bandaged area for 20 minutes every 2-3 hours. This may help reduce swelling, bruising, and pain. Make sure the ice pack is waterproof so that the pressure bandage does not get wet.   You may see a small amount of drainage or blood on your pressure bandage. This is normal. However, if drainage or bleeding continues or saturates the bandage, you will need to apply firm pressure over the bandage with a washcloth for 15 minutes. If bleeding continues after applying pressure for 15 minutes then go to the nearest emergency room.  48 Hours After Surgery  Carefully remove the bandage and start daily wound care and dressing changes. You may also now shower and get the wound wet.  Daily Wound Care:  Wash wound with a mild soap and water.  Use caution when washing the wound, be gentle and do not let the forceful shower stream hit the wound  directly.  Pat dry.  Apply Vaseline (from a new container or tube) over the suture line with a Q-tip. It is very important to keep the wound continuously moist, as wounds heal best in a moist environment.  Keep the site covered until sutures have dissolved.  You can cover it with a Telfa (non-stick) dressing and tape or a band-aid.    If you are unable to keep wound covered, you must apply Vaseline every 2-3 hours (while awake) to ensure it is being kept moist for optimal healing. A dressing overnight is recommended to keep the area moist.  Call Us If:  You have pain that is not controlled with Tylenol/Ibuprofen  You have signs or symptoms of an infection, such as: fever over 100 degrees F, redness, warmth, or foul-smelling or yellow drainage from the wound.  Who should I call with questions?  [facility]: [phone]   [facility]: [phone]  For urgent needs outside of business hours call the [facility] at [phone] and ask to speak with the dermatology resident on call

## 2023-05-23 NOTE — NURSING NOTE
Rafal Spears's chief complaint for this visit includes:  Chief Complaint   Patient presents with     Procedure     Cyst excision x 3     PCP: Lisandro - [facility], [facility]    Referring Provider:  No referring provider defined for this encounter.    BP (!) 159/99   Pulse 73   No Pain (0)      No Known Allergies      Do you need any medication refills at today's visit? No    Janny Bui CMA

## 2023-05-23 NOTE — NURSING NOTE
The following medication was given:     MEDICATION:  Lidocaine with epinephrine 1% [id number]  ROUTE: SQ  SITE: see procedure note  DOSE:  17 ml  LOT #: [id number]  : [employer]  EXPIRATION DATE: 12/31/2024  NDC#: 42572-148-30  Was there drug waste? 2 ml  Multi-dose vial: Yes    Janny Bui CMA  May 23, 2023    Mastisol, Steri-Strips, Tegaderm and pressure dressing applied to excision site on left upper arm.  Wound care instructions reviewed with patient and AVS provided.  Patient verbalized understanding.  Patient will follow up for suture removal: N/A.  No further questions or concerns at this time.    Vaseline and pressure dressing applied to excision site on superior forehead and left parietal scalp.  Wound care instructions reviewed with patient and AVS provided.  Patient verbalized understanding.  Patient will follow up for suture removal: N/A.  No further questions or concerns at this time.

## 2023-05-24 ENCOUNTER — TELEPHONE (OUTPATIENT)
Dept: DERMATOLOGY | Facility: CLINIC | Age: 38
End: 2023-05-24
Payer: COMMERCIAL

## 2023-05-24 NOTE — TELEPHONE ENCOUNTER
SUBJECTIVE/OBJECTIVE:                                                    Thiago is 1 day s/p excision of cyst x 3.     ASSESSMENT:                                                       NURSING ASSESSMENT:     Wound location: left upper arm, superior forehead and left parietal scalp       What are you doing to manage your pain? Tylenol and ice    Is it helping?  Somewhat.  Didn't sleep well last night.  I recommended taking 1000 mg of Tylenol in addition to 400 mg of Ibuprofen before bed.    Are you applying ice? Yes    Have you had any noticeable bleeding through the bandage?  No, dressing is dry and intact    Do you have any concerns?  Patient notified that nothing should be getting worse after 48 hrs and to call if it is.     PLAN:                                                       Wound care directions reviewed.  Follow up prn.    Jen Davies RN

## 2023-05-25 LAB
PATH REPORT.COMMENTS IMP SPEC: NORMAL
PATH REPORT.COMMENTS IMP SPEC: NORMAL
PATH REPORT.FINAL DX SPEC: NORMAL
PATH REPORT.GROSS SPEC: NORMAL
PATH REPORT.MICROSCOPIC SPEC OTHER STN: NORMAL
PATH REPORT.RELEVANT HX SPEC: NORMAL

## 2023-08-07 ENCOUNTER — MYC MEDICAL ADVICE (OUTPATIENT)
Dept: FAMILY MEDICINE | Facility: CLINIC | Age: 38
End: 2023-08-07
Payer: COMMERCIAL

## 2023-08-14 ASSESSMENT — PATIENT HEALTH QUESTIONNAIRE - PHQ9
SUM OF ALL RESPONSES TO PHQ QUESTIONS 1-9: 12
10. IF YOU CHECKED OFF ANY PROBLEMS, HOW DIFFICULT HAVE THESE PROBLEMS MADE IT FOR YOU TO DO YOUR WORK, TAKE CARE OF THINGS AT HOME, OR GET ALONG WITH OTHER PEOPLE: SOMEWHAT DIFFICULT
SUM OF ALL RESPONSES TO PHQ QUESTIONS 1-9: 12

## 2023-08-15 ENCOUNTER — VIRTUAL VISIT (OUTPATIENT)
Dept: FAMILY MEDICINE | Facility: CLINIC | Age: 38
End: 2023-08-15
Payer: COMMERCIAL

## 2023-08-15 ENCOUNTER — TELEPHONE (OUTPATIENT)
Dept: FAMILY MEDICINE | Facility: CLINIC | Age: 38
End: 2023-08-15

## 2023-08-15 DIAGNOSIS — R03.0 ELEVATED BLOOD PRESSURE READING WITHOUT DIAGNOSIS OF HYPERTENSION: ICD-10-CM

## 2023-08-15 DIAGNOSIS — E66.3 OVERWEIGHT (BMI 25.0-29.9): Primary | ICD-10-CM

## 2023-08-15 PROCEDURE — 99214 OFFICE O/P EST MOD 30 MIN: CPT | Mod: VID | Performed by: FAMILY MEDICINE

## 2023-08-15 NOTE — PROGRESS NOTES
"Thiago is a 38 year old who is being evaluated via a billable video visit.      How would you like to obtain your AVS? MyChart  If the video visit is dropped, the invitation should be resent by: Send to e-mail at: [email or web address]  Will anyone else be joining your video visit? No          Assessment & Plan     Overweight (BMI 25.0-29.9)  Struggling to lose weight despite healthy dietary and exercise habits.  Ready to start medication management to assist. Reviewed timing of taking, onset, benefits, monitoring and typicall and severe AE of the medication.    - Semaglutide-Weight Management (WEGOVY) 0.25 MG/0.5ML pen; Inject 0.25 mg Subcutaneous once a week    Elevated blood pressure reading without diagnosis of hypertension  Significantly elevated blood pressure at last dermatology visit.  We discussed that it is imperative to get his blood pressure under good control and medication is warranted if readings are continuing at this level.  He will start checking blood pressure at home and let me know if readings continue to be over 140/90..  Hopefully, weight loss, should be helpful with this also.  - Semaglutide-Weight Management (WEGOVY) 0.25 MG/0.5ML pen; Inject 0.25 mg Subcutaneous once a week             BMI:   Estimated body mass index is 28.54 kg/m  as calculated from the following:    Height as of 2/8/23: 1.852 m (6' 0.91\").    Weight as of 4/17/23: 97.9 kg (215 lb 12.8 oz).       Patient Instructions   Start semaglutide 0.25 mg once weekly x 4 weeks  Message me prior to next refill if you are tolerating the medication and interested in increasing the dose.   Next medication check in 2-3 months     Consider visit with our medical weight loss clinic. Message me for referral if desired.     Monitor blood pressure (goal is < 140/90). We need to start blood pressure medication if the pressure is remaining elevated.   - check blood pressure at rest for 5-10 minute  - avoid checking blood pressure within 30 minutes " of caffeine or exercise.      Melissa Ramachandran MD  [facility] ANSELMO Das is a 38 year old, presenting for the following health issues:  Recheck Medication      8/15/2023     7:12 AM   Additional Questions   Roomed by Tanya       History of Present Illness       Reason for visit:  Medication question/weight managemebt  Symptom onset:  More than a month  Symptom intensity:  Moderate  Symptom progression:  Worsening  Had these symptoms before:  No    He eats 2-3 servings of fruits and vegetables daily.He consumes 2 sweetened beverage(s) daily.He exercises with enough effort to increase his heart rate 30 to 60 minutes per day.  He exercises with enough effort to increase his heart rate 4 days per week. He is missing 1 dose(s) of medications per week.  He is not taking prescribed medications regularly due to remembering to take.     Challenges managing his weight.   Trying to monitor and watch what he is eating.   Continuing to gain weight.     Worried that his weight is driving his elevated bp    Interested in semaglutide    No MEN, med thyroid cancer in family.     Not checking bp regularly              Patient sent [employer] message:  I've been struggling with my weight for about a year now.  I have been trying to exercise at least 3 times per week, and keep my calorie intake under 2,000 adrián/day.       Unfortunately I haven't been able to lose weight, and for some reason I'm actually gaining weight.  This is leading to a number of negative side effects, most concerning to me is sustained elevated blood pressure.  Based on my BMI, I believe I am nearing the technical level of obesity.       I've been hearing a lot about a medication called semaglutide and how it's been successful in cases similar to mine.  I'd be interested to hear your thoughts on this medication and if it may be a good option for me.      Review of Systems         Objective           Vitals:  No vitals were  obtained today due to virtual visit.    Physical Exam   GENERAL: Healthy, alert and no distress  EYES: Eyes grossly normal to inspection.  No discharge or erythema, or obvious scleral/conjunctival abnormalities.  RESP: No audible wheeze, cough, or visible cyanosis.  No visible retractions or increased work of breathing.    SKIN: Visible skin clear. No significant rash, abnormal pigmentation or lesions.  NEURO: Cranial nerves grossly intact.  Mentation and speech appropriate for age.  PSYCH: Mentation appears normal, affect normal/bright, judgement and insight intact, normal speech and appearance well-groomed.    Reviewed labs through epic.             Video-Visit Details    Type of service:  Video Visit     Originating Location (pt. Location): Home    Distant Location (provider location):  On-site  Platform used for Video Visit: Emergent Views

## 2023-08-15 NOTE — TELEPHONE ENCOUNTER
PRIOR AUTHORIZATION DENIED    Medication: WEGOVY 0.25 MG/0.5ML SC SOAJ  Insurance Company: [employer] ([facility]) - Phone [phone] Fax [phone]  Denial Date: 8/15/2023  Denial Rational: DOES NOT MEET CRITERIA - SEE MORE BELOW    Appeal Information:     Patient Notified: No

## 2023-08-15 NOTE — PATIENT INSTRUCTIONS
Start semaglutide 0.25 mg once weekly x 4 weeks  Message me prior to next refill if you are tolerating the medication and interested in increasing the dose.   Next medication check in 2-3 months     Consider visit with our medical weight loss clinic. Message me for referral if desired.     Monitor blood pressure (goal is < 140/90). We need to start blood pressure medication if the pressure is remaining elevated.   - check blood pressure at rest for 5-10 minute  - avoid checking blood pressure within 30 minutes of caffeine or exercise.

## 2023-09-27 ENCOUNTER — MYC MEDICAL ADVICE (OUTPATIENT)
Dept: FAMILY MEDICINE | Facility: CLINIC | Age: 38
End: 2023-09-27
Payer: COMMERCIAL

## 2023-09-27 NOTE — TELEPHONE ENCOUNTER
Patient should have refills on file. [facility] pharmacy staff states patient hasn't picked up his prescription since February, so he has refills available. They will work on getting this prescription ready for him. Patient updated via [employer].    BHARGAV Del Cid  [facility]

## 2024-01-09 ENCOUNTER — PATIENT OUTREACH (OUTPATIENT)
Dept: CARE COORDINATION | Facility: CLINIC | Age: 39
End: 2024-01-09
Payer: COMMERCIAL

## 2024-01-23 ENCOUNTER — PATIENT OUTREACH (OUTPATIENT)
Dept: CARE COORDINATION | Facility: CLINIC | Age: 39
End: 2024-01-23
Payer: COMMERCIAL

## 2024-02-28 DIAGNOSIS — F33.1 MODERATE RECURRENT MAJOR DEPRESSION (H): ICD-10-CM

## 2024-02-28 DIAGNOSIS — F41.9 ANXIETY: ICD-10-CM

## 2024-02-28 RX ORDER — BUPROPION HYDROCHLORIDE 150 MG/1
150 TABLET, EXTENDED RELEASE ORAL DAILY
Qty: 90 TABLET | Refills: 0 | Status: SHIPPED | OUTPATIENT
Start: 2024-02-28 | End: 2024-05-30

## 2024-03-08 DIAGNOSIS — F41.9 ANXIETY: ICD-10-CM

## 2024-03-08 DIAGNOSIS — F33.1 MODERATE RECURRENT MAJOR DEPRESSION (H): ICD-10-CM

## 2024-03-08 RX ORDER — SERTRALINE HYDROCHLORIDE 100 MG/1
100 TABLET, FILM COATED ORAL DAILY
Qty: 90 TABLET | Refills: 0 | Status: SHIPPED | OUTPATIENT
Start: 2024-03-08 | End: 2024-06-03

## 2024-03-23 ENCOUNTER — HEALTH MAINTENANCE LETTER (OUTPATIENT)
Age: 39
End: 2024-03-23

## 2024-05-30 DIAGNOSIS — F33.1 MODERATE RECURRENT MAJOR DEPRESSION (H): ICD-10-CM

## 2024-05-30 DIAGNOSIS — F41.9 ANXIETY: ICD-10-CM

## 2024-05-30 RX ORDER — BUPROPION HYDROCHLORIDE 150 MG/1
150 TABLET, EXTENDED RELEASE ORAL DAILY
Qty: 90 TABLET | Refills: 0 | Status: SHIPPED | OUTPATIENT
Start: 2024-05-30 | End: 2024-08-05

## 2024-06-03 ENCOUNTER — TELEPHONE (OUTPATIENT)
Dept: FAMILY MEDICINE | Facility: CLINIC | Age: 39
End: 2024-06-03
Payer: COMMERCIAL

## 2024-06-03 DIAGNOSIS — F33.1 MODERATE RECURRENT MAJOR DEPRESSION (H): ICD-10-CM

## 2024-06-03 DIAGNOSIS — F41.9 ANXIETY: ICD-10-CM

## 2024-06-03 RX ORDER — SERTRALINE HYDROCHLORIDE 100 MG/1
100 TABLET, FILM COATED ORAL DAILY
Qty: 90 TABLET | Refills: 0 | Status: SHIPPED | OUTPATIENT
Start: 2024-06-03 | End: 2024-07-03

## 2024-06-03 NOTE — TELEPHONE ENCOUNTER
Sonia refill sent  Due for med check prior to further refills, prefer in person given last visit was virtual  Ok for same day slot for med check in next few mo okay

## 2024-06-21 ENCOUNTER — MYC MEDICAL ADVICE (OUTPATIENT)
Dept: FAMILY MEDICINE | Facility: CLINIC | Age: 39
End: 2024-06-21
Payer: COMMERCIAL

## 2024-06-21 DIAGNOSIS — L21.9 SEBORRHEIC DERMATITIS: ICD-10-CM

## 2024-06-21 RX ORDER — KETOCONAZOLE 20 MG/G
CREAM TOPICAL 2 TIMES DAILY
Qty: 60 G | Refills: 1 | Status: SHIPPED | OUTPATIENT
Start: 2024-06-21

## 2024-07-03 ENCOUNTER — OFFICE VISIT (OUTPATIENT)
Dept: FAMILY MEDICINE | Facility: CLINIC | Age: 39
End: 2024-07-03
Payer: COMMERCIAL

## 2024-07-03 VITALS
SYSTOLIC BLOOD PRESSURE: 150 MMHG | OXYGEN SATURATION: 100 % | HEART RATE: 96 BPM | WEIGHT: 232.7 LBS | RESPIRATION RATE: 19 BRPM | BODY MASS INDEX: 30.84 KG/M2 | TEMPERATURE: 96.8 F | DIASTOLIC BLOOD PRESSURE: 102 MMHG | HEIGHT: 73 IN

## 2024-07-03 DIAGNOSIS — I10 ESSENTIAL HYPERTENSION: ICD-10-CM

## 2024-07-03 DIAGNOSIS — F33.1 MODERATE RECURRENT MAJOR DEPRESSION (H): ICD-10-CM

## 2024-07-03 DIAGNOSIS — E66.811 CLASS 1 OBESITY IN ADULT, UNSPECIFIED BMI, UNSPECIFIED OBESITY TYPE, UNSPECIFIED WHETHER SERIOUS COMORBIDITY PRESENT: ICD-10-CM

## 2024-07-03 DIAGNOSIS — F41.9 ANXIETY: Primary | ICD-10-CM

## 2024-07-03 PROCEDURE — 96127 BRIEF EMOTIONAL/BEHAV ASSMT: CPT | Performed by: FAMILY MEDICINE

## 2024-07-03 PROCEDURE — G2211 COMPLEX E/M VISIT ADD ON: HCPCS | Performed by: FAMILY MEDICINE

## 2024-07-03 PROCEDURE — 80048 BASIC METABOLIC PNL TOTAL CA: CPT | Performed by: FAMILY MEDICINE

## 2024-07-03 PROCEDURE — 84443 ASSAY THYROID STIM HORMONE: CPT | Performed by: FAMILY MEDICINE

## 2024-07-03 PROCEDURE — 36415 COLL VENOUS BLD VENIPUNCTURE: CPT | Performed by: FAMILY MEDICINE

## 2024-07-03 PROCEDURE — 99214 OFFICE O/P EST MOD 30 MIN: CPT | Performed by: FAMILY MEDICINE

## 2024-07-03 RX ORDER — AMLODIPINE BESYLATE 5 MG/1
5 TABLET ORAL DAILY
Qty: 30 TABLET | Refills: 1 | Status: SHIPPED | OUTPATIENT
Start: 2024-07-03 | End: 2024-08-30 | Stop reason: DRUGHIGH

## 2024-07-03 RX ORDER — SERTRALINE HYDROCHLORIDE 100 MG/1
150 TABLET, FILM COATED ORAL DAILY
Qty: 135 TABLET | Refills: 0 | Status: SHIPPED | OUTPATIENT
Start: 2024-07-03

## 2024-07-03 RX ORDER — NALTREXONE HYDROCHLORIDE 50 MG/1
TABLET, FILM COATED ORAL
Qty: 30 TABLET | Refills: 1 | Status: SHIPPED | OUTPATIENT
Start: 2024-07-03 | End: 2025-07-10

## 2024-07-03 ASSESSMENT — PAIN SCALES - GENERAL: PAINLEVEL: NO PAIN (0)

## 2024-07-03 ASSESSMENT — ANXIETY QUESTIONNAIRES
GAD7 TOTAL SCORE: 10
7. FEELING AFRAID AS IF SOMETHING AWFUL MIGHT HAPPEN: NOT AT ALL
7. FEELING AFRAID AS IF SOMETHING AWFUL MIGHT HAPPEN: NOT AT ALL
GAD7 TOTAL SCORE: 10
1. FEELING NERVOUS, ANXIOUS, OR ON EDGE: NEARLY EVERY DAY
2. NOT BEING ABLE TO STOP OR CONTROL WORRYING: SEVERAL DAYS
3. WORRYING TOO MUCH ABOUT DIFFERENT THINGS: SEVERAL DAYS
IF YOU CHECKED OFF ANY PROBLEMS ON THIS QUESTIONNAIRE, HOW DIFFICULT HAVE THESE PROBLEMS MADE IT FOR YOU TO DO YOUR WORK, TAKE CARE OF THINGS AT HOME, OR GET ALONG WITH OTHER PEOPLE: SOMEWHAT DIFFICULT
6. BECOMING EASILY ANNOYED OR IRRITABLE: NEARLY EVERY DAY
4. TROUBLE RELAXING: MORE THAN HALF THE DAYS
GAD7 TOTAL SCORE: 10
5. BEING SO RESTLESS THAT IT IS HARD TO SIT STILL: NOT AT ALL
8. IF YOU CHECKED OFF ANY PROBLEMS, HOW DIFFICULT HAVE THESE MADE IT FOR YOU TO DO YOUR WORK, TAKE CARE OF THINGS AT HOME, OR GET ALONG WITH OTHER PEOPLE?: SOMEWHAT DIFFICULT

## 2024-07-03 ASSESSMENT — PATIENT HEALTH QUESTIONNAIRE - PHQ9
SUM OF ALL RESPONSES TO PHQ QUESTIONS 1-9: 10
10. IF YOU CHECKED OFF ANY PROBLEMS, HOW DIFFICULT HAVE THESE PROBLEMS MADE IT FOR YOU TO DO YOUR WORK, TAKE CARE OF THINGS AT HOME, OR GET ALONG WITH OTHER PEOPLE: SOMEWHAT DIFFICULT
SUM OF ALL RESPONSES TO PHQ QUESTIONS 1-9: 10

## 2024-07-03 NOTE — PROGRESS NOTES
Assessment & Plan     Anxiety  Moderate recurrent major depression (H)  Moderate flare in anxiety recently.  We talked through some options with his medications including changing sertraline to another option or increasing the dose.  He prefers the latter to start.  Will push-up sertraline from 100-150.  Encouraged him to minimize alcohol and caffeine.  Consider therapy discussed  - sertraline (ZOLOFT) 100 MG tablet; Take 1.5 tablets (150 mg) by mouth daily  - TSH with free T4 reflex; Future  - TSH with free T4 reflex    Essential hypertension  New diagnosis.  He has had several visits now with elevated blood pressure.  Certainly anxiety and his recent weight gain are likely contributing but we discussed the importance of treating with where the blood pressure is at now.  He is open to this and we will start amlodipine. Reviewed timing of taking, onset, benefits, monitoring and typicall and severe AE of the medication.  Lifestyle measures reviewed to help lower blood pressure  - amLODIPine (NORVASC) 5 MG tablet; Take 1 tablet (5 mg) by mouth daily  - Basic metabolic panel  (Ca, Cl, CO2, Creat, Gluc, K, Na, BUN); Future  - UA Macroscopic with reflex to Microscopic and Culture - Lab Collect; Future  - Albumin Random Urine Quantitative with Creat Ratio; Future  - Basic metabolic panel  (Ca, Cl, CO2, Creat, Gluc, K, Na, BUN)  - UA Macroscopic with reflex to Microscopic and Culture - Lab Collect  - Albumin Random Urine Quantitative with Creat Ratio    Class 1 obesity in adult, unspecified BMI, unspecified obesity type, unspecified whether serious comorbidity present  He is very interested in medications for weight loss.  I reviewed the denial of Wegovy last year by his insurance.  Essentially they will only cover this medicine if his BMI is 35 or higher.  Given his hypertension he is not a candidate right now for phentermine so we did discuss Contrave as an option since he is already on the bupropion.  He is interested  "in beginning the naltrexone. Reviewed timing of taking, onset, benefits, monitoring and typicall and severe AE of the medication.  Discussed healthy diet, and encouraged to restart my fitness pal as this was helpful for him in the past.  Hopefully as we improve his anxiety he can also get back to regular exercise.  - naltrexone (DEPADE/REVIA) 50 MG tablet; Take 0.5 tablets (25 mg) by mouth daily for 7 days, THEN 1 tablet (50 mg) daily.          BMI  Estimated body mass index is 30.7 kg/m  as calculated from the following:    Height as of this encounter: 1.854 m (6' 1\").    Weight as of this encounter: 105.6 kg (232 lb 11.2 oz).         Patient Instructions   Monitor blood pressure. Goal blood pressure  is < 140/90 but < 130/80 is ideal  - check blood pressure at rest for 5-10 minute  - avoid checking blood pressure within 30 minutes of caffeine or exercise.      Tips to lower blood pressure  - lower the sodium intake in your diet. Aim for 2,000 mg per day or less  - exercise daily, especially aerobic exercise can lower blood pressure  - minimize caffeine and alcohol 2 cups of coffee in the morning so we can go from 2-1 or something to be helpful I usually  - work on lowering your weight. Even small drops in weight can reduce blood pressure    Start amlodipine 5 mg daily    For anxiety- increase sertraline to 150 mg daily  For weight management- start naltrexone.       Meghan Das is a 38 year old, presenting for the following health issues:  Recheck Medication (Sertraline & bupropion)        7/3/2024     3:48 PM   Additional Questions   Roomed by Rosa Elena   Accompanied by self     History of Present Illness       Mental Health Follow-up:  Patient presents to follow-up on Depression & Anxiety.Patient's depression since last visit has been:  No change  The patient is not having other symptoms associated with depression.  Patient's anxiety since last visit has been:  Worse  The patient is not having other symptoms " "associated with anxiety.  Any significant life events: health concerns  Patient is not feeling anxious or having panic attacks.  Patient has no concerns about alcohol or drug use.    He eats 2-3 servings of fruits and vegetables daily.He consumes 2 sweetened beverage(s) daily.He exercises with enough effort to increase his heart rate 10 to 19 minutes per day.  He exercises with enough effort to increase his heart rate 3 or less days per week. He is missing 2 dose(s) of medications per week.     Gained some weight since last year.   Wegovy wasn't covered for him due to bmi last year.   Not sure why gaining weight.   Covid end of last summer. As recovered fell when walking dog and broke ribs so couodn't exercise for 1-2 mo. Hard to get back to routine of exercise.   Trying to eat relatively healthy.  CSA and gets regular veggie intake.   Not counting calories but has done this in past with my fitness pal.  Found this was helpful when he used it.. Hard to get back in habit of doing this.   Thinks gaining weight since on anti-depressants.     Moods: Doing okay overall but admits to More anxious recently. Feeling more on edge. No panic. Feeling of doom. Sleeping okay most times. Occ will wake and can't fall back to sleep.   No self harm thoughts. lHas been more consistent with taking sertraline recently.     Not checking home blood pressures  No CP, palp, dizziness, peripheral edema or dyspnea.  No vision changes              2/8/2023     3:19 PM 8/14/2023     3:50 PM 7/3/2024     3:42 PM   PHQ-9 SCORE   PHQ-9 Total Score MyChart 10 (Moderate depression) 12 (Moderate depression) 10 (Moderate depression)   PHQ-9 Total Score 10 12 10         2/25/2021     8:54 AM 5/9/2022     9:41 AM 7/3/2024     3:46 PM   NONA-7 SCORE   Total Score 5 (mild anxiety) 10 (moderate anxiety) 10 (moderate anxiety)   Total Score 5 10 10               Objective    BP (!) 150/102   Pulse 96   Temp 96.8  F (36  C)   Resp 19   Ht 1.854 m (6' 1\")  "  Wt 105.6 kg (232 lb 11.2 oz)   SpO2 100%   BMI 30.70 kg/m    Body mass index is 30.7 kg/m .  Physical Exam   GENERAL: alert and no distress  NECK: no adenopathy, no asymmetry, masses, or scars  RESP: lungs clear to auscultation - no rales, rhonchi or wheezes  CV: regular rate and rhythm, normal S1 S2, no S3 or S4, no murmur, click or rub, no peripheral edema  MS: no gross musculoskeletal defects noted, no edema  PSYCH: mentation appears normal, affect normal/bright, anxious, judgement and insight intact, appearance well groomed, and fidgety  Neuro: Fine tremor present.            Signed Electronically by: Melissa Ramachandran MD

## 2024-07-03 NOTE — PATIENT INSTRUCTIONS
Monitor blood pressure. Goal blood pressure  is < 140/90 but < 130/80 is ideal  - check blood pressure at rest for 5-10 minute  - avoid checking blood pressure within 30 minutes of caffeine or exercise.      Tips to lower blood pressure  - lower the sodium intake in your diet. Aim for 2,000 mg per day or less  - exercise daily, especially aerobic exercise can lower blood pressure  - minimize caffeine and alcohol 2 cups of coffee in the morning so we can go from 2-1 or something to be helpful I usually  - work on lowering your weight. Even small drops in weight can reduce blood pressure    Start amlodipine 5 mg daily    For anxiety- increase sertraline to 150 mg daily  For weight management- start naltrexone.

## 2024-07-05 LAB
ANION GAP SERPL CALCULATED.3IONS-SCNC: 14 MMOL/L (ref 7–15)
BUN SERPL-MCNC: 13.1 MG/DL (ref 6–20)
CALCIUM SERPL-MCNC: 9.8 MG/DL (ref 8.6–10)
CHLORIDE SERPL-SCNC: 101 MMOL/L (ref 98–107)
CREAT SERPL-MCNC: 0.92 MG/DL (ref 0.67–1.17)
DEPRECATED HCO3 PLAS-SCNC: 25 MMOL/L (ref 22–29)
EGFRCR SERPLBLD CKD-EPI 2021: >90 ML/MIN/1.73M2
GLUCOSE SERPL-MCNC: 111 MG/DL (ref 70–99)
POTASSIUM SERPL-SCNC: 4.3 MMOL/L (ref 3.4–5.3)
SODIUM SERPL-SCNC: 140 MMOL/L (ref 135–145)
TSH SERPL DL<=0.005 MIU/L-ACNC: 1.96 UIU/ML (ref 0.3–4.2)

## 2024-07-05 NOTE — RESULT ENCOUNTER NOTE
Rafal,  It was a pleasure to see you in the office recently.   The kidney and electrolyte panel was normal (glucose level is fine for a non-fasting sample).   The thyroid test was normal.   Please MyChart or call if you have any concerns or questions.   Sincerely,  Melissa Ramachandran MD

## 2024-08-04 DIAGNOSIS — F41.9 ANXIETY: ICD-10-CM

## 2024-08-04 DIAGNOSIS — F33.1 MODERATE RECURRENT MAJOR DEPRESSION (H): ICD-10-CM

## 2024-08-05 RX ORDER — BUPROPION HYDROCHLORIDE 150 MG/1
150 TABLET, EXTENDED RELEASE ORAL DAILY
Qty: 90 TABLET | Refills: 3 | Status: SHIPPED | OUTPATIENT
Start: 2024-08-05

## 2024-08-12 ENCOUNTER — OFFICE VISIT (OUTPATIENT)
Dept: FAMILY MEDICINE | Facility: CLINIC | Age: 39
End: 2024-08-12
Payer: COMMERCIAL

## 2024-08-12 VITALS
RESPIRATION RATE: 18 BRPM | TEMPERATURE: 98.5 F | HEIGHT: 73 IN | WEIGHT: 227.2 LBS | OXYGEN SATURATION: 97 % | DIASTOLIC BLOOD PRESSURE: 102 MMHG | SYSTOLIC BLOOD PRESSURE: 141 MMHG | HEART RATE: 79 BPM | BODY MASS INDEX: 30.11 KG/M2

## 2024-08-12 DIAGNOSIS — E66.3 OVERWEIGHT (BMI 25.0-29.9): ICD-10-CM

## 2024-08-12 DIAGNOSIS — F33.1 MODERATE RECURRENT MAJOR DEPRESSION (H): ICD-10-CM

## 2024-08-12 DIAGNOSIS — F41.9 ANXIETY: ICD-10-CM

## 2024-08-12 DIAGNOSIS — I10 ESSENTIAL HYPERTENSION: Primary | ICD-10-CM

## 2024-08-12 PROCEDURE — G2211 COMPLEX E/M VISIT ADD ON: HCPCS | Performed by: FAMILY MEDICINE

## 2024-08-12 PROCEDURE — 96127 BRIEF EMOTIONAL/BEHAV ASSMT: CPT | Performed by: FAMILY MEDICINE

## 2024-08-12 PROCEDURE — 99214 OFFICE O/P EST MOD 30 MIN: CPT | Performed by: FAMILY MEDICINE

## 2024-08-12 RX ORDER — SERTRALINE HYDROCHLORIDE 100 MG/1
200 TABLET, FILM COATED ORAL DAILY
Qty: 180 TABLET | Refills: 1 | Status: SHIPPED | OUTPATIENT
Start: 2024-08-12

## 2024-08-12 RX ORDER — AMLODIPINE BESYLATE 10 MG/1
10 TABLET ORAL DAILY
Qty: 30 TABLET | Refills: 1 | Status: SHIPPED | OUTPATIENT
Start: 2024-08-12 | End: 2024-10-07

## 2024-08-12 ASSESSMENT — PAIN SCALES - GENERAL: PAINLEVEL: NO PAIN (0)

## 2024-08-12 NOTE — PATIENT INSTRUCTIONS
Monitor blood pressure. Goal blood pressure  is < 140/90 but < 130/80 is ideal  - check blood pressure at rest for 5-10 minute  - avoid checking blood pressure within 30 minutes of caffeine or exercise.      Tips to lower blood pressure  - lower the sodium intake in your diet. Aim for 2,000 mg per day or less  - exercise daily, especially aerobic exercise can lower blood pressure  - minimize caffeine and alcohol  - work on lowering your weight. Even small drops in weight can reduce blood pressure      - increase amlodipine to 10 mg daily ( 5+5) but new 10 mg dose tablet sent to pharmacy.     - increase sertraline to 200 mg daily     - consider medical assistant visit for blood pressure check in few weeks.   If blood pressure still running high (both home and office) would add hydrochlorothiazide 12.5 mg and get labs few weeks later.

## 2024-08-12 NOTE — PROGRESS NOTES
Assessment & Plan     Essential hypertension  Not yet at goal.  Unclear how much of this is due to severe anxiety with getting his blood pressure checked.  I did encourage him to get a home blood pressure cuff to start monitoring there.  Will push up amlodipine from 5 to 10 mg.  Follow-up MA BP check in a couple weeks.  If BP still elevated and continues to be elevated at home would also add on hydrochlorothiazide 12.5 mg as next up  - amLODIPine (NORVASC) 10 MG tablet; Take 1 tablet (10 mg) by mouth daily    Moderate recurrent major depression (H)  Anxiety  Still feelings of background anxiety although did notice certainly some improvement overall in his anxiety with increase of sertraline from 100-1 50.  Will push-up dose to the full 200 mg to see if we can get things better control  - sertraline (ZOLOFT) 100 MG tablet; Take 2 tablets (200 mg) by mouth daily    Overweight (BMI 25.0-29.9)  Pleased with appetite suppression now with the naltrexone and bupropion.  Seems to be tolerating the naltrexone now although did have significant nausea initially.  Has had some good interval weight loss.            Patient Instructions   Monitor blood pressure. Goal blood pressure  is < 140/90 but < 130/80 is ideal  - check blood pressure at rest for 5-10 minute  - avoid checking blood pressure within 30 minutes of caffeine or exercise.      Tips to lower blood pressure  - lower the sodium intake in your diet. Aim for 2,000 mg per day or less  - exercise daily, especially aerobic exercise can lower blood pressure  - minimize caffeine and alcohol  - work on lowering your weight. Even small drops in weight can reduce blood pressure      - increase amlodipine to 10 mg daily ( 5+5) but new 10 mg dose tablet sent to pharmacy.     - increase sertraline to 200 mg daily     - consider medical assistant visit for blood pressure check in few weeks.   If blood pressure still running high (both home and office) would add  hydrochlorothiazide 12.5 mg and get labs few weeks later.       Meghan Das is a 39 year old, presenting for the following health issues:  Hypertension        8/12/2024    10:27 AM   Additional Questions   Roomed by Rosa Elena   Accompanied by self     History of Present Illness       Hypertension: He presents for follow up of hypertension.  He does not check blood pressure  regularly outside of the clinic. Outside blood pressures have been over 140/90. He does not follow a low salt diet.     He eats 4 or more servings of fruits and vegetables daily.He consumes 2 sweetened beverage(s) daily.He exercises with enough effort to increase his heart rate 30 to 60 minutes per day.  He exercises with enough effort to increase his heart rate 3 or less days per week.   He is taking medications regularly.         Medication Followup of amlodipine  Taking Medication as prescribed: yes  Side Effects:  None  Medication Helping Symptoms:  not sure           2/8/2023     3:19 PM 8/14/2023     3:50 PM 7/3/2024     3:42 PM   PHQ-9 SCORE   PHQ-9 Total Score MyChart 10 (Moderate depression) 12 (Moderate depression) 10 (Moderate depression)   PHQ-9 Total Score 10 12 10         2/25/2021     8:54 AM 5/9/2022     9:41 AM 7/3/2024     3:46 PM   NONA-7 SCORE   Total Score 5 (mild anxiety) 10 (moderate anxiety) 10 (moderate anxiety)   Total Score 5 10 10     Started amlodipine right after last visit  Amlodipine 5 mg once daily. No current side effects.   Occ headache for few days with first starting.  Not monitoring blood pressure at home.  Does not yet have a home cuff    Start naltrexone few weeks ago. Severe nausea with the med, seems to be improving somewhat and tolerating now  Feels it is helping with the appetite suppression and would like to stay with it.     Anxiety/depression:  Sertraline dose was increased from 100 to 150 mg. Overall helpful. Still feel anxious with getting bp checked and some background every day anxiety still  "present. .    No longer trouble sleeping or feeling of doom.             Objective    BP (!) 154/102 (BP Location: Right arm, Patient Position: Sitting, Cuff Size: Adult Large)   Pulse 79   Temp 98.5  F (36.9  C) (Oral)   Resp 18   Ht 1.854 m (6' 1\")   Wt 103.1 kg (227 lb 3.2 oz)   SpO2 97%   BMI 29.98 kg/m    Body mass index is 29.98 kg/m .  Physical Exam   GENERAL: alert and no distress  RESP: lungs clear to auscultation - no rales, rhonchi or wheezes  CV: regular rate and rhythm, normal S1 S2, no S3 or S4, no murmur, click or rub, no peripheral edema  ABDOMEN: soft, nontender, no hepatosplenomegaly, no masses and bowel sounds normal  MS: no gross musculoskeletal defects noted, no edema  PSYCH: mentation appears normal, affect normal/bright        2/8/2023     3:19 PM 8/14/2023     3:50 PM 7/3/2024     3:42 PM   PHQ-9 SCORE   PHQ-9 Total Score MyChart 10 (Moderate depression) 12 (Moderate depression) 10 (Moderate depression)   PHQ-9 Total Score 10 12 10         2/25/2021     8:54 AM 5/9/2022     9:41 AM 7/3/2024     3:46 PM   NONA-7 SCORE   Total Score 5 (mild anxiety) 10 (moderate anxiety) 10 (moderate anxiety)   Total Score 5 10 10             Signed Electronically by: Melissa Ramachandran MD    "

## 2024-08-30 DIAGNOSIS — I10 ESSENTIAL HYPERTENSION: ICD-10-CM

## 2024-08-30 RX ORDER — AMLODIPINE BESYLATE 5 MG/1
5 TABLET ORAL DAILY
Qty: 30 TABLET | Refills: 1 | OUTPATIENT
Start: 2024-08-30

## 2024-09-18 ENCOUNTER — TELEPHONE (OUTPATIENT)
Dept: FAMILY MEDICINE | Facility: CLINIC | Age: 39
End: 2024-09-18
Payer: COMMERCIAL

## 2024-09-18 NOTE — TELEPHONE ENCOUNTER
Patient Quality Outreach    Patient is due for the following:   Hypertension -  BP check    Next Steps:   Schedule a nurse only visit for blood pressure check    Type of outreach:    Sent [employer] message.      Questions for provider review:    None           Diane L. Schoenherr, RN

## 2024-10-06 DIAGNOSIS — I10 ESSENTIAL HYPERTENSION: ICD-10-CM

## 2024-10-07 RX ORDER — AMLODIPINE BESYLATE 10 MG/1
10 TABLET ORAL DAILY
Qty: 90 TABLET | Refills: 0 | Status: SHIPPED | OUTPATIENT
Start: 2024-10-07

## 2024-11-10 DIAGNOSIS — I10 ESSENTIAL HYPERTENSION: ICD-10-CM

## 2024-11-11 RX ORDER — AMLODIPINE BESYLATE 10 MG/1
10 TABLET ORAL DAILY
Qty: 90 TABLET | Refills: 0 | OUTPATIENT
Start: 2024-11-11

## 2025-01-22 ENCOUNTER — OFFICE VISIT (OUTPATIENT)
Dept: FAMILY MEDICINE | Facility: CLINIC | Age: 40
End: 2025-01-22
Payer: COMMERCIAL

## 2025-01-22 VITALS
HEART RATE: 82 BPM | WEIGHT: 233.1 LBS | RESPIRATION RATE: 11 BRPM | DIASTOLIC BLOOD PRESSURE: 109 MMHG | BODY MASS INDEX: 30.89 KG/M2 | HEIGHT: 73 IN | OXYGEN SATURATION: 100 % | SYSTOLIC BLOOD PRESSURE: 146 MMHG | TEMPERATURE: 98 F

## 2025-01-22 DIAGNOSIS — F33.1 MODERATE RECURRENT MAJOR DEPRESSION (H): ICD-10-CM

## 2025-01-22 DIAGNOSIS — Z13.220 SCREENING CHOLESTEROL LEVEL: ICD-10-CM

## 2025-01-22 DIAGNOSIS — Z00.00 ROUTINE GENERAL MEDICAL EXAMINATION AT A HEALTH CARE FACILITY: Primary | ICD-10-CM

## 2025-01-22 DIAGNOSIS — F41.9 ANXIETY: ICD-10-CM

## 2025-01-22 DIAGNOSIS — I10 ESSENTIAL HYPERTENSION: ICD-10-CM

## 2025-01-22 PROCEDURE — 99396 PREV VISIT EST AGE 40-64: CPT | Performed by: FAMILY MEDICINE

## 2025-01-22 PROCEDURE — G2211 COMPLEX E/M VISIT ADD ON: HCPCS | Performed by: FAMILY MEDICINE

## 2025-01-22 PROCEDURE — 99214 OFFICE O/P EST MOD 30 MIN: CPT | Mod: 25 | Performed by: FAMILY MEDICINE

## 2025-01-22 PROCEDURE — 96127 BRIEF EMOTIONAL/BEHAV ASSMT: CPT | Performed by: FAMILY MEDICINE

## 2025-01-22 RX ORDER — HYDROCHLOROTHIAZIDE 12.5 MG/1
12.5 TABLET ORAL DAILY
Qty: 30 TABLET | Refills: 0 | Status: SHIPPED | OUTPATIENT
Start: 2025-01-22

## 2025-01-22 RX ORDER — SERTRALINE HYDROCHLORIDE 100 MG/1
200 TABLET, FILM COATED ORAL DAILY
Qty: 180 TABLET | Refills: 1 | Status: SHIPPED | OUTPATIENT
Start: 2025-01-22

## 2025-01-22 RX ORDER — AMLODIPINE BESYLATE 10 MG/1
10 TABLET ORAL DAILY
Qty: 90 TABLET | Refills: 0 | Status: SHIPPED | OUTPATIENT
Start: 2025-01-22

## 2025-01-22 RX ORDER — BUPROPION HYDROCHLORIDE 150 MG/1
150 TABLET, EXTENDED RELEASE ORAL 2 TIMES DAILY
Qty: 180 TABLET | Refills: 0 | Status: SHIPPED | OUTPATIENT
Start: 2025-01-22

## 2025-01-22 SDOH — HEALTH STABILITY: PHYSICAL HEALTH: ON AVERAGE, HOW MANY MINUTES DO YOU ENGAGE IN EXERCISE AT THIS LEVEL?: 30 MIN

## 2025-01-22 SDOH — HEALTH STABILITY: PHYSICAL HEALTH: ON AVERAGE, HOW MANY DAYS PER WEEK DO YOU ENGAGE IN MODERATE TO STRENUOUS EXERCISE (LIKE A BRISK WALK)?: 3 DAYS

## 2025-01-22 ASSESSMENT — SOCIAL DETERMINANTS OF HEALTH (SDOH): HOW OFTEN DO YOU GET TOGETHER WITH FRIENDS OR RELATIVES?: TWICE A WEEK

## 2025-01-22 ASSESSMENT — PATIENT HEALTH QUESTIONNAIRE - PHQ9
SUM OF ALL RESPONSES TO PHQ QUESTIONS 1-9: 9
SUM OF ALL RESPONSES TO PHQ QUESTIONS 1-9: 9
10. IF YOU CHECKED OFF ANY PROBLEMS, HOW DIFFICULT HAVE THESE PROBLEMS MADE IT FOR YOU TO DO YOUR WORK, TAKE CARE OF THINGS AT HOME, OR GET ALONG WITH OTHER PEOPLE: SOMEWHAT DIFFICULT

## 2025-01-22 ASSESSMENT — PAIN SCALES - GENERAL: PAINLEVEL_OUTOF10: NO PAIN (0)

## 2025-01-22 NOTE — PROGRESS NOTES
"Preventive Care Visit  [facility] ANSELMO Torres Leta Ramachandran MD, Family Medicine  Jan 22, 2025      Assessment & Plan     Routine general medical examination at a health care facility  Fairly high alcohol intake.  Encouraged him to start cutting down on intake  He has multiple nevi.  Recommended yearly skin exam with dermatology.  He has seen them in the past and will schedule    Essential hypertension  Not at goal send blood pressure is significantly elevated today.  Certainly could have some whitecoat syndrome at play but even home readings are borderline.  Will continue amlodipine but add hydrochlorothiazide 12.5 mg (also discussed losartan as an option).  He will schedule lab only visit in 2 to 3 weeks.  We can further titrate medication from there.  - hydroCHLOROthiazide 12.5 MG tablet; Take 1 tablet (12.5 mg) by mouth daily.  - Basic metabolic panel  (Ca, Cl, CO2, Creat, Gluc, K, Na, BUN); Future  - amLODIPine (NORVASC) 10 MG tablet; Take 1 tablet (10 mg) by mouth daily.    Moderate recurrent major depression (H)  Anxiety  Fair control.  He feels meds have been quite helpful but he continues to have some intermittent symptoms.  We discussed changing sertraline to a different antidepressant versus further increasing bupropion which he prefers given he has still been low on motivation.  Will plan for med recheck in 4 weeks and further adjust medicines at that time.  - buPROPion (WELLBUTRIN SR) 150 MG 12 hr tablet; Take 1 tablet (150 mg) by mouth 2 times daily.  - sertraline (ZOLOFT) 100 MG tablet; Take 2 tablets (200 mg) by mouth daily.    Screening cholesterol level  - Lipid panel reflex to direct LDL Fasting; Future            BMI  Estimated body mass index is 30.75 kg/m  as calculated from the following:    Height as of this encounter: 1.854 m (6' 1\").    Weight as of this encounter: 105.7 kg (233 lb 1.6 oz).   Weight management plan: Discussed healthy diet and exercise " "guidelines    Counseling  Appropriate preventive services were addressed with this patient via screening, questionnaire, or discussion as appropriate for fall prevention, nutrition, physical activity, Tobacco-use cessation, social engagement, weight loss and cognition.  Checklist reviewing preventive services available has been given to the patient.  Reviewed patient's diet, addressing concerns and/or questions.   He is at risk for lack of exercise and has been provided with information to increase physical activity for the benefit of his well-being.   The patient was instructed to see the dentist every 6 months.   He is at risk for psychosocial distress and has been provided with information to reduce risk.   The patient reports drinking more than 3 alcoholic drinks per day and/or more than 7 drhnks per week. The patient was counseled and given information about possible harmful effects of excessive alcohol intake.The patient's PHQ-9 score is consistent with mild depression. He was provided with information regarding depression.       See Patient Instructions  Start hydrochlorothiazide 12.5 mg daily in morning.   Continue amlodipine 10 mg  Lab only visit in 2-3 weeks to check kidney and electrolyte panel     Increase wellbutrin to twice daily. Take second dose in the afternoon.     Vitamin D 1,000 international unit(s) per day   (Nature made)    Schedule skin exam with dermatology    Subjective   Thiago is a 39 year old, presenting for the following:  Physical (Annual)        1/22/2025     3:25 PM   Additional Questions   Roomed by Meenakshi   Accompanied by Self         1/22/2025     3:25 PM   Patient Reported Additional Medications   Patient reports taking the following new medications None          HPI    Sertraline dose increased to 200 mg this fall. Not sure how much it helped.   Feel \"fine\"  Thinks anxiety is overall managed. Still comes and goes.     Bp: home readings at home have been better.  135/90. "     Stopped naltrexone as was making him feel unwell.   Doing better with eating healthy. More mindful  Still with mild lethargy and harder to motivate to exercise   Broken ribs after slipping on pavement when had new dog few years ago. Not back to regular exercise since then.         8/14/2023     3:50 PM 7/3/2024     3:42 PM 1/22/2025     1:01 PM   PHQ-9 SCORE   PHQ-9 Total Score MyChart 12 (Moderate depression) 10 (Moderate depression) 9 (Mild depression)   PHQ-9 Total Score 12 10 9        Patient-reported         2/25/2021     8:54 AM 5/9/2022     9:41 AM 7/3/2024     3:46 PM   NONA-7 SCORE   Total Score 5 (mild anxiety) 10 (moderate anxiety) 10 (moderate anxiety)   Total Score 5 10 10         Health Care Directive  Patient does not have a Health Care Directive: Patient states has Advance Directive and will bring in a copy to clinic.      1/22/2025   General Health   How would you rate your overall physical health? (!) FAIR   Feel stress (tense, anxious, or unable to sleep) Rather much   (!) STRESS CONCERN      1/22/2025   Nutrition   Three or more servings of calcium each day? Yes   Diet: I don't know   How many servings of fruit and vegetables per day? (!) 2-3   How many sweetened beverages each day? (!) 2         1/22/2025   Exercise   Days per week of moderate/strenous exercise 3 days   Average minutes spent exercising at this level 30 min         1/22/2025   Social Factors   Frequency of gathering with friends or relatives Twice a week   Worry food won't last until get money to buy more No   Food not last or not have enough money for food? No   Do you have housing? (Housing is defined as stable permanent housing and does not include staying ouside in a car, in a tent, in an abandoned building, in an overnight shelter, or couch-surfing.) No   Are you worried about losing your housing? No   Lack of transportation? No   Unable to get utilities (heat,electricity)? No   Want help with housing or utility concern?  No   (!) HOUSING CONCERN PRESENT      2025   Dental   Dentist two times every year? (!) NO         2025   TB Screening   Were you born outside of the [place]? No       Today's PHQ-9 Score:       2025     1:01 PM   PHQ-9 SCORE   PHQ-9 Total Score MyChart 9 (Mild depression)   PHQ-9 Total Score 9        Patient-reported         2025   Substance Use   Alcohol more than 3/day or more than 7/wk Yes   How often do you have a drink containing alcohol 4 or more times a week   How many alcohol drinks on typical day 1 or 2   How often do you have 5+ drinks at one occasion Less than monthly   Audit 2/3 Score 1   How often not able to stop drinking once started Never   How often failed to do what normally expected Never   How often needed first drink in am after a heavy drinking session Never   How often feeling of guilt or remorse after drinking Less than monthly   How often unable to remember what happened the night before Never   Have you or someone else been injured because of your drinking No   Has anyone been concerned or suggested you cut down on drinking No   TOTAL SCORE - AUDIT 6   Do you use any other substances recreationally? No     Social History     Tobacco Use    Smoking status: Former     Current packs/day: 0.00     Average packs/day: 0.5 packs/day for 10.0 years (5.0 ttl pk-yrs)     Types: Cigarettes     Start date: 2003     Quit date: 2013     Years since quittin.0    Smokeless tobacco: Never    Tobacco comments:     Smoked off and on for 10 years.    Vaping Use    Vaping status: Never Used   Substance Use Topics    Alcohol use: Yes     Comment: maybe 5-10 drinks per week    Drug use: No           2025   STI Screening   New sexual partner(s) since last STI/HIV test? No   ASCVD Risk   The 10-year ASCVD risk score (Werner VAIL, et al., 2019) is: 1.2%    Values used to calculate the score:      Age: 39 years      Sex: Male      Is Non- : No       "Diabetic: No      Tobacco smoker: No      Systolic Blood Pressure: 146 mmHg      Is BP treated: Yes      HDL Cholesterol: 64 mg/dL      Total Cholesterol: 187 mg/dL        1/22/2025   Contraception/Family Planning   Questions about contraception or family planning No        Reviewed and updated as needed this visit by Provider                          Review of Systems  Constitutional, neuro, ENT, endocrine, pulmonary, cardiac, gastrointestinal, genitourinary, musculoskeletal, integument and psychiatric systems are negative, except as otherwise noted.     Objective    Exam  BP (!) 146/109 (BP Location: Right arm, Patient Position: Sitting, Cuff Size: Adult Large)   Pulse 82   Temp 98  F (36.7  C) (Temporal)   Resp 11   Ht 1.854 m (6' 1\")   Wt 105.7 kg (233 lb 1.6 oz)   SpO2 100%   BMI 30.75 kg/m     Estimated body mass index is 30.75 kg/m  as calculated from the following:    Height as of this encounter: 1.854 m (6' 1\").    Weight as of this encounter: 105.7 kg (233 lb 1.6 oz).    Physical Exam  GENERAL: alert and no distress  EYES: Eyes grossly normal to inspection, PERRL and conjunctivae and sclerae normal  HENT: ear canals and TM's normal, nose and mouth without ulcers or lesions  NECK: no adenopathy, no asymmetry, masses, or scars  RESP: lungs clear to auscultation - no rales, rhonchi or wheezes  CV: regular rate and rhythm, normal S1 S2, no S3 or S4, no murmur, click or rub, no peripheral edema  ABDOMEN: soft, nontender, no hepatosplenomegaly, no masses and bowel sounds normal   (male): normal male genitalia without lesions or urethral discharge, no hernia  MS: no gross musculoskeletal defects noted, no edema  SKIN: no suspicious lesions or rashes  NEURO: Normal strength and tone, mentation intact and speech normal  PSYCH: mentation appears normal, affect normal/bright, anxious and fidgity, judgement and insight intact, and appearance well groomed        Signed Electronically by: Melissa Gillette " MD [place]

## 2025-01-22 NOTE — PATIENT INSTRUCTIONS
Start hydrochlorothiazide 12.5 mg daily in morning.   Continue amlodipine 10 mg  Lab only visit in 2-3 weeks to check kidney and electrolyte panel     Increase wellbutrin to twice daily. Take second dose in the afternoon.     Vitamin D 1,000 international unit(s) per day   (Nature made)    Schedule skin exam with dermatology        Patient Education   Preventive Care Advice   This is general advice given by our system to help you stay healthy. However, your care team may have specific advice just for you. Please talk to your care team about your preventive care needs.  Nutrition  Eat 5 or more servings of fruits and vegetables each day.  Try wheat bread, brown rice and whole grain pasta (instead of white bread, rice, and pasta).  Get enough calcium and vitamin D. Check the label on foods and aim for 100% of the RDA (recommended daily allowance).  Lifestyle  Exercise at least 150 minutes each week  (30 minutes a day, 5 days a week).  Do muscle strengthening activities 2 days a week. These help control your weight and prevent disease.  No smoking.  Wear sunscreen to prevent skin cancer.  Have a dental exam and cleaning every 6 months.  Yearly exams  See your health care team every year to talk about:  Any changes in your health.  Any medicines your care team has prescribed.  Preventive care, family planning, and ways to prevent chronic diseases.  Shots (vaccines)   HPV shots (up to age 26), if you've never had them before.  Hepatitis B shots (up to age 59), if you've never had them before.  COVID-19 shot: Get this shot when it's due.  Flu shot: Get a flu shot every year.  Tetanus shot: Get a tetanus shot every 10 years.  Pneumococcal, hepatitis A, and RSV shots: Ask your care team if you need these based on your risk.  Shingles shot (for age 50 and up)  General health tests  Diabetes screening:  Starting at age 35, Get screened for diabetes at least every 3 years.  If you are younger than age 35, ask your care team if  you should be screened for diabetes.  Cholesterol test: At age 39, start having a cholesterol test every 5 years, or more often if advised.  Bone density scan (DEXA): At age 50, ask your care team if you should have this scan for osteoporosis (brittle bones).  Hepatitis C: Get tested at least once in your life.  STIs (sexually transmitted infections)  Before age 24: Ask your care team if you should be screened for STIs.  After age 24: Get screened for STIs if you're at risk. You are at risk for STIs (including HIV) if:  You are sexually active with more than one person.  You don't use condoms every time.  You or a partner was diagnosed with a sexually transmitted infection.  If you are at risk for HIV, ask about PrEP medicine to prevent HIV.  Get tested for HIV at least once in your life, whether you are at risk for HIV or not.  Cancer screening tests  Cervical cancer screening: If you have a cervix, begin getting regular cervical cancer screening tests starting at age 21.  Breast cancer scan (mammogram): If you've ever had breasts, begin having regular mammograms starting at age 40. This is a scan to check for breast cancer.  Colon cancer screening: It is important to start screening for colon cancer at age 45.  Have a colonoscopy test every 10 years (or more often if you're at risk) Or, ask your provider about stool tests like a FIT test every year or Cologuard test every 3 years.  To learn more about your testing options, visit:   .  For help making a decision, visit:   https://bit.ly/ld21476.  Prostate cancer screening test: If you have a prostate, ask your care team if a prostate cancer screening test (PSA) at age 55 is right for you.  Lung cancer screening: If you are a current or former smoker ages 50 to 80, ask your care team if ongoing lung cancer screenings are right for you.  For informational purposes only. Not to replace the advice of your health care provider. Copyright   2023 [employer].  All rights reserved. Clinically reviewed by the [facility]. [employer] 230296 - REV 01/24.  Learning About Stress  What is stress?     Stress is your body's response to a hard situation. Your body can have a physical, emotional, or mental response. Stress is a fact of life for most people, and it affects everyone differently. What causes stress for you may not be stressful for someone else.  A lot of things can cause stress. You may feel stress when you go on a job interview, take a test, or run a race. This kind of short-term stress is normal and even useful. It can help you if you need to work hard or react quickly. For example, stress can help you finish an important job on time.  Long-term stress is caused by ongoing stressful situations or events. Examples of long-term stress include long-term health problems, ongoing problems at work, or conflicts in your family. Long-term stress can harm your health.  How does stress affect your health?  When you are stressed, your body responds as though you are in danger. It makes hormones that speed up your heart, make you breathe faster, and give you a burst of energy. This is called the fight-or-flight stress response. If the stress is over quickly, your body goes back to normal and no harm is done.  But if stress happens too often or lasts too long, it can have bad effects. Long-term stress can make you more likely to get sick, and it can make symptoms of some diseases worse. If you tense up when you are stressed, you may develop neck, shoulder, or low back pain. Stress is linked to high blood pressure and heart disease.  Stress also harms your emotional health. It can make you rosario, tense, or depressed. Your relationships may suffer, and you may not do well at work or school.  What can you do to manage stress?  You can try these things to help manage stress:   Do something active. Exercise or activity can help reduce stress. Walking is a  great way to get started. Even everyday activities such as housecleaning or yard work can help.  Try yoga or lisette chi. These techniques combine exercise and meditation. You may need some training at first to learn them.  Do something you enjoy. For example, listen to music or go to a movie. Practice your hobby or do volunteer work.  Meditate. This can help you relax, because you are not worrying about what happened before or what may happen in the future.  Do guided imagery. Imagine yourself in any setting that helps you feel calm. You can use online videos, books, or a teacher to guide you.  Do breathing exercises. For example:  From a standing position, bend forward from the waist with your knees slightly bent. Let your arms dangle close to the floor.  Breathe in slowly and deeply as you return to a standing position. Roll up slowly and lift your head last.  Hold your breath for just a few seconds in the standing position.  Breathe out slowly and bend forward from the waist.  Let your feelings out. Talk, laugh, cry, and express anger when you need to. Talking with supportive friends or family, a counselor, or a pop leader about your feelings is a healthy way to relieve stress. Avoid discussing your feelings with people who make you feel worse.  Write. It may help to write about things that are bothering you. This helps you find out how much stress you feel and what is causing it. When you know this, you can find better ways to cope.  What can you do to prevent stress?  You might try some of these things to help prevent stress:  Manage your time. This helps you find time to do the things you want and need to do.  Get enough sleep. Your body recovers from the stresses of the day while you are sleeping.  Get support. Your family, friends, and community can make a difference in how you experience stress.  Limit your news feed. Avoid or limit time on social media or news that may make you feel stressed.  Do something  "active. Exercise or activity can help reduce stress. Walking is a great way to get started.  Where can you learn more?  Go to https://www.[employer].net/patiented  Enter N032 in the search box to learn more about \"Learning About Stress.\"  Current as of: October 24, 2023  Content Version: 14.3    2024 [employer].   Care instructions adapted under license by your healthcare professional. If you have questions about a medical condition or this instruction, always ask your healthcare professional. [employer] disclaims any warranty or liability for your use of this information.    Learning About Depression Screening  What is depression screening?  Depression screening is a way to see if you have depression symptoms. It may be done by a doctor or counselor. It's often part of a routine checkup. That's because your mental health is just as important as your physical health.  Depression is a mental health condition that affects how you feel, think, and act. You may:  Have less energy.  Lose interest in your daily activities.  Feel sad and grouchy for a long time.  Depression is very common. It affects people of all ages.  Many things can lead to depression. Some people become depressed after they have a stroke or find out they have a major illness like cancer or heart disease. The death of a loved one or a breakup may lead to depression. It can run in families. Most experts believe that a combination of inherited genes and stressful life events can cause it.  What happens during screening?  You may be asked to fill out a form about your depression symptoms. You and the doctor will discuss your answers. The doctor may ask you more questions to learn more about how you think, act, and feel.  What happens after screening?  If you have symptoms of depression, your doctor will talk to you about your options.  Doctors usually treat depression with medicines or counseling. Often, combining the two works " "best. Many people don't get help because they think that they'll get over the depression on their own. But people with depression may not get better unless they get treatment.  The cause of depression is not well understood. There may be many factors involved. But if you have depression, it's not your fault.  A serious symptom of depression is thinking about death or suicide. If you or someone you care about talks about this or about feeling hopeless, get help right away.  It's important to know that depression can be treated. Medicine, counseling, and self-care may help.  Where can you learn more?  Go to https://www.[employer].net/patiented  Enter T185 in the search box to learn more about \"Learning About Depression Screening.\"  Current as of: July 31, 2024  Content Version: 14.3    2024 [employer].   Care instructions adapted under license by your healthcare professional. If you have questions about a medical condition or this instruction, always ask your healthcare professional. [employer] disclaims any warranty or liability for your use of this information.    9 Ways to Cut Back on Drinking  Maybe you've found yourself drinking more alcohol than you'd prefer. If you want to cut back, here are some ideas to try.    Think before you drink.  Do you really want a drink, or is it just a habit? If you're used to having a drink at a certain time, try doing something else then.     Look for substitutes.  Find some no-alcohol drinks that you enjoy, like flavored seltzer water, tea with honey, or tonic with a slice of lime. Or try alcohol-free beer or \"virgin\" cocktails (without the alcohol).     Drink more water.  Use water to quench your thirst. Drink a glass of water before you have any alcohol. Have another glass along with every drink or between drinks.     Shrink your drink.  For example, have a bottle of beer instead of a pint. Use a smaller glass for wine. Choose drinks with lower alcohol " "content (ABV%). Or use less liquor and more mixer in cocktails.     Slow down.  It's easy to drink quickly and without thinking about it. Pay attention, and make each drink last longer.     Do the math.  Total up how much you spend on alcohol each month. How much is that a year? If you cut back, what could you do with the money you save?     Take a break.  Choose a day or two each week when you won't drink at all. Notice how you feel on those days, physically and emotionally. How did you sleep? Do you feel better? Over time, add more break days.     Count calories.  Would you like to lose some weight? For some people that's a good motivator for cutting back. Figure out how many calories are in each drink. How many does that add up to in a day? In a week? In a month?     Practice saying no.  Be ready when someone offers you a drink. Try: \"Thanks, I've had enough.\" Or \"Thanks, but I'm cutting back.\" Or \"No, thanks. I feel better when I drink less.\"   Current as of: November 15, 2023  Content Version: 14.3    2024 [employer].   Care instructions adapted under license by your healthcare professional. If you have questions about a medical condition or this instruction, always ask your healthcare professional. [employer] disclaims any warranty or liability for your use of this information.     "

## 2025-02-04 DIAGNOSIS — F33.1 MODERATE RECURRENT MAJOR DEPRESSION (H): ICD-10-CM

## 2025-02-04 DIAGNOSIS — F41.9 ANXIETY: ICD-10-CM

## 2025-02-04 RX ORDER — SERTRALINE HYDROCHLORIDE 100 MG/1
150 TABLET, FILM COATED ORAL DAILY
Qty: 135 TABLET | OUTPATIENT
Start: 2025-02-04

## 2025-06-22 DIAGNOSIS — F33.1 MODERATE RECURRENT MAJOR DEPRESSION (H): ICD-10-CM

## 2025-06-22 DIAGNOSIS — F41.9 ANXIETY: ICD-10-CM

## 2025-06-23 RX ORDER — SERTRALINE HYDROCHLORIDE 100 MG/1
200 TABLET, FILM COATED ORAL DAILY
Qty: 180 TABLET | Refills: 1 | Status: SHIPPED | OUTPATIENT
Start: 2025-06-23

## 2025-07-22 DIAGNOSIS — F33.1 MODERATE RECURRENT MAJOR DEPRESSION (H): ICD-10-CM

## 2025-07-22 DIAGNOSIS — F41.9 ANXIETY: ICD-10-CM

## 2025-07-22 RX ORDER — BUPROPION HYDROCHLORIDE 150 MG/1
150 TABLET, EXTENDED RELEASE ORAL 2 TIMES DAILY
Qty: 60 TABLET | Refills: 0 | Status: SHIPPED | OUTPATIENT
Start: 2025-07-22

## 2025-08-13 DIAGNOSIS — F33.1 MODERATE RECURRENT MAJOR DEPRESSION (H): ICD-10-CM

## 2025-08-13 DIAGNOSIS — F41.9 ANXIETY: ICD-10-CM

## 2025-08-13 RX ORDER — BUPROPION HYDROCHLORIDE 150 MG/1
150 TABLET, EXTENDED RELEASE ORAL 2 TIMES DAILY
Qty: 60 TABLET | Refills: 0 | Status: SHIPPED | OUTPATIENT
Start: 2025-08-13

## 2025-08-21 ENCOUNTER — MYC REFILL (OUTPATIENT)
Dept: FAMILY MEDICINE | Facility: CLINIC | Age: 40
End: 2025-08-21
Payer: COMMERCIAL

## 2025-08-21 DIAGNOSIS — L21.9 SEBORRHEIC DERMATITIS: ICD-10-CM

## 2025-08-21 RX ORDER — KETOCONAZOLE 20 MG/G
CREAM TOPICAL 2 TIMES DAILY
Qty: 60 G | Refills: 1 | Status: SHIPPED | OUTPATIENT
Start: 2025-08-21